# Patient Record
Sex: FEMALE | Race: WHITE | NOT HISPANIC OR LATINO
[De-identification: names, ages, dates, MRNs, and addresses within clinical notes are randomized per-mention and may not be internally consistent; named-entity substitution may affect disease eponyms.]

---

## 2017-02-16 ENCOUNTER — APPOINTMENT (OUTPATIENT)
Dept: SURGERY | Facility: CLINIC | Age: 57
End: 2017-02-16

## 2017-02-16 LAB
ALBUMIN SERPL ELPH-MCNC: 4.7 G/DL
ALP BLD-CCNC: 92 U/L
ALT SERPL-CCNC: 32 U/L
ANION GAP SERPL CALC-SCNC: 13 MMOL/L
AST SERPL-CCNC: 19 U/L
BASOPHILS # BLD AUTO: 0.02 K/UL
BASOPHILS NFR BLD AUTO: 0.4 %
BILIRUB SERPL-MCNC: 0.6 MG/DL
BUN SERPL-MCNC: 17 MG/DL
CALCIUM SERPL-MCNC: 9.8 MG/DL
CHLORIDE SERPL-SCNC: 100 MMOL/L
CHOLEST SERPL-MCNC: 225 MG/DL
CHOLEST/HDLC SERPL: 3.6 RATIO
CO2 SERPL-SCNC: 24 MMOL/L
CREAT SERPL-MCNC: 0.83 MG/DL
EOSINOPHIL # BLD AUTO: 0.14 K/UL
EOSINOPHIL NFR BLD AUTO: 2.5 %
GLUCOSE SERPL-MCNC: 97 MG/DL
HBA1C MFR BLD HPLC: 5.7 %
HCT VFR BLD CALC: 44.8 %
HDLC SERPL-MCNC: 62 MG/DL
HGB BLD-MCNC: 14.5 G/DL
IMM GRANULOCYTES NFR BLD AUTO: 0.4 %
LDLC SERPL CALC-MCNC: 146 MG/DL
LYMPHOCYTES # BLD AUTO: 2.01 K/UL
LYMPHOCYTES NFR BLD AUTO: 35.8 %
MAN DIFF?: NORMAL
MCHC RBC-ENTMCNC: 27.3 PG
MCHC RBC-ENTMCNC: 32.4 GM/DL
MCV RBC AUTO: 84.4 FL
MONOCYTES # BLD AUTO: 0.33 K/UL
MONOCYTES NFR BLD AUTO: 5.9 %
NEUTROPHILS # BLD AUTO: 3.09 K/UL
NEUTROPHILS NFR BLD AUTO: 55 %
PLATELET # BLD AUTO: 264 K/UL
POTASSIUM SERPL-SCNC: 4.2 MMOL/L
PROT SERPL-MCNC: 7.4 G/DL
RBC # BLD: 5.31 M/UL
RBC # FLD: 13.7 %
SODIUM SERPL-SCNC: 137 MMOL/L
TRIGL SERPL-MCNC: 87 MG/DL
WBC # FLD AUTO: 5.61 K/UL

## 2017-02-17 LAB
24R-OH-CALCIDIOL SERPL-MCNC: 61.1 PG/ML
CALCIUM SERPL-MCNC: 9.8 MG/DL
PARATHYROID HORMONE INTACT: 53 PG/ML
T3 SERPL-MCNC: 112 NG/DL
T4 FREE SERPL-MCNC: 1.4 NG/DL
THYROGLOB AB SERPL-ACNC: <20 IU/ML
THYROPEROXIDASE AB SERPL IA-ACNC: <10 IU/ML
TSH SERPL-ACNC: 4.92 UIU/ML

## 2017-02-22 ENCOUNTER — OTHER (OUTPATIENT)
Age: 57
End: 2017-02-22

## 2017-03-19 ENCOUNTER — RESULT REVIEW (OUTPATIENT)
Age: 57
End: 2017-03-19

## 2017-08-14 ENCOUNTER — RX RENEWAL (OUTPATIENT)
Age: 57
End: 2017-08-14

## 2017-08-23 ENCOUNTER — RX RENEWAL (OUTPATIENT)
Age: 57
End: 2017-08-23

## 2017-11-06 ENCOUNTER — RX RENEWAL (OUTPATIENT)
Age: 57
End: 2017-11-06

## 2018-02-05 ENCOUNTER — RX RENEWAL (OUTPATIENT)
Age: 58
End: 2018-02-05

## 2018-03-24 ENCOUNTER — EMERGENCY (EMERGENCY)
Facility: HOSPITAL | Age: 58
LOS: 1 days | Discharge: HOME | End: 2018-03-24
Attending: EMERGENCY MEDICINE | Admitting: EMERGENCY MEDICINE
Payer: COMMERCIAL

## 2018-03-24 VITALS
DIASTOLIC BLOOD PRESSURE: 104 MMHG | HEART RATE: 100 BPM | SYSTOLIC BLOOD PRESSURE: 176 MMHG | WEIGHT: 235.01 LBS | HEIGHT: 70 IN | OXYGEN SATURATION: 95 % | RESPIRATION RATE: 20 BRPM

## 2018-03-24 VITALS
HEART RATE: 86 BPM | SYSTOLIC BLOOD PRESSURE: 157 MMHG | OXYGEN SATURATION: 99 % | RESPIRATION RATE: 16 BRPM | DIASTOLIC BLOOD PRESSURE: 96 MMHG | TEMPERATURE: 98 F

## 2018-03-24 PROCEDURE — 99282 EMERGENCY DEPT VISIT SF MDM: CPT | Mod: 25

## 2018-03-24 RX ORDER — PHENYLEPHRINE HCL 0.25 %
1 AEROSOL, SPRAY WITH PUMP (ML) NASAL ONCE
Qty: 0 | Refills: 0 | Status: COMPLETED | OUTPATIENT
Start: 2018-03-24 | End: 2018-03-24

## 2018-03-24 RX ADMIN — Medication 1 SPRAY(S): at 05:47

## 2018-03-24 NOTE — ED PROVIDER NOTE - PROGRESS NOTE DETAILS
Alejo: Bleeding controlled w/ phenylephrine and direct pressure. Provided w/ ENT follow up, return precautions.

## 2018-03-24 NOTE — ED PROVIDER NOTE - ATTENDING CONTRIBUTION TO CARE
I was physically present for the E/M service provided. I agree with above history, physical, and plan which I have reviewed and edited where appropriate. I was physically present for the key portions of the service provided.    VSS. Bleeding from left nare. Teated with hemostasis per note. f/u ENT.

## 2018-03-24 NOTE — ED PROVIDER NOTE - OBJECTIVE STATEMENT
57yof w/ hypothyroid p/w epistaxis. Woke up w/ severe nose bleed at 0400 this am, initially from the left naris and then bilateral with blood running down the throat. Tried direct pressure which slowed the bleeding but still feels blood running down the throat. Has had nosebleeds in the distant past requiring cautery but no recent bleeding. No trauma.

## 2018-03-24 NOTE — ED PROVIDER NOTE - MEDICAL DECISION MAKING DETAILS
57yof w/ epistaxis, bleeding slowed currently. No unclear source of bleeding. Phenylephrine, pressure, reassess. Pack if necessary. No brisk bleeding or airway compromise. Low suspicion for posterior bleed.

## 2018-03-24 NOTE — ED ADULT NURSE NOTE - CHPI ED SYMPTOMS NEG
no weakness/no change in level of consciousness/no chills/no loss of consciousness/no nausea/no fever/no blurred vision/no numbness/no syncope/no vomiting

## 2018-03-24 NOTE — ED PROVIDER NOTE - ENMT, MLM
Airway patent, Mouth with normal mucosa. Small oozing blood in the left naris, no focal source of bleeding seen anteriorly. Trace blood posterior pharynx.

## 2018-03-24 NOTE — ED ADULT NURSE NOTE - OBJECTIVE STATEMENT
58 yo female pt presents to ed complaining of epistaxis. as per pt "I woke up with a nose bleed, and it won't stop." pt denies blood thinner use. pt bleeding from left nare. md pat at bedside. pt denies trauma/falls. pt denies difficulty breathing. airway patent. no blood in back of throat noted. aox3. breath sounds clear and equal bilaterally. skin warm dry and intact. pt ambulates with steady gait. color normal for race, radial pulses strong and equal bilaterally. cap refill brisk. safety maintained. family at bedside.

## 2018-03-26 RX ORDER — LEVOTHYROXINE SODIUM 125 MCG
0 TABLET ORAL
Qty: 0 | Refills: 0 | COMMUNITY

## 2018-04-23 ENCOUNTER — RESULT REVIEW (OUTPATIENT)
Age: 58
End: 2018-04-23

## 2018-05-01 ENCOUNTER — APPOINTMENT (OUTPATIENT)
Dept: DERMATOLOGY | Facility: CLINIC | Age: 58
End: 2018-05-01
Payer: COMMERCIAL

## 2018-05-01 VITALS
DIASTOLIC BLOOD PRESSURE: 78 MMHG | SYSTOLIC BLOOD PRESSURE: 130 MMHG | WEIGHT: 230 LBS | HEIGHT: 70 IN | BODY MASS INDEX: 32.93 KG/M2

## 2018-05-01 DIAGNOSIS — L82.1 OTHER SEBORRHEIC KERATOSIS: ICD-10-CM

## 2018-05-01 DIAGNOSIS — L91.8 OTHER HYPERTROPHIC DISORDERS OF THE SKIN: ICD-10-CM

## 2018-05-01 DIAGNOSIS — L65.8 OTHER SPECIFIED NONSCARRING HAIR LOSS: ICD-10-CM

## 2018-05-01 DIAGNOSIS — L71.9 ROSACEA, UNSPECIFIED: ICD-10-CM

## 2018-05-01 DIAGNOSIS — L81.4 OTHER MELANIN HYPERPIGMENTATION: ICD-10-CM

## 2018-05-01 DIAGNOSIS — L81.1 CHLOASMA: ICD-10-CM

## 2018-05-01 PROCEDURE — 99203 OFFICE O/P NEW LOW 30 MIN: CPT

## 2018-05-08 ENCOUNTER — APPOINTMENT (OUTPATIENT)
Dept: SURGERY | Facility: CLINIC | Age: 58
End: 2018-05-08
Payer: COMMERCIAL

## 2018-05-08 ENCOUNTER — RX RENEWAL (OUTPATIENT)
Age: 58
End: 2018-05-08

## 2018-05-08 PROCEDURE — 36415 COLL VENOUS BLD VENIPUNCTURE: CPT

## 2018-05-08 PROCEDURE — 99213 OFFICE O/P EST LOW 20 MIN: CPT

## 2018-05-09 LAB
24R-OH-CALCIDIOL SERPL-MCNC: 74.5 PG/ML
BASOPHILS # BLD AUTO: 0.02 K/UL
BASOPHILS NFR BLD AUTO: 0.4 %
EOSINOPHIL # BLD AUTO: 0.12 K/UL
EOSINOPHIL NFR BLD AUTO: 2.2 %
HCT VFR BLD CALC: 41.7 %
HGB BLD-MCNC: 13.6 G/DL
IMM GRANULOCYTES NFR BLD AUTO: 0.2 %
LYMPHOCYTES # BLD AUTO: 1.73 K/UL
LYMPHOCYTES NFR BLD AUTO: 31.3 %
MAN DIFF?: NORMAL
MCHC RBC-ENTMCNC: 27.3 PG
MCHC RBC-ENTMCNC: 32.6 GM/DL
MCV RBC AUTO: 83.6 FL
MONOCYTES # BLD AUTO: 0.25 K/UL
MONOCYTES NFR BLD AUTO: 4.5 %
NEUTROPHILS # BLD AUTO: 3.39 K/UL
NEUTROPHILS NFR BLD AUTO: 61.4 %
PLATELET # BLD AUTO: 289 K/UL
RBC # BLD: 4.99 M/UL
RBC # FLD: 13.6 %
T3 SERPL-MCNC: 109 NG/DL
T4 FREE SERPL-MCNC: 1.5 NG/DL
THYROGLOB AB SERPL-ACNC: <20 IU/ML
THYROPEROXIDASE AB SERPL IA-ACNC: <10 IU/ML
TSH SERPL-ACNC: 4.91 UIU/ML
WBC # FLD AUTO: 5.52 K/UL

## 2018-05-10 ENCOUNTER — OTHER (OUTPATIENT)
Age: 58
End: 2018-05-10

## 2018-05-10 LAB
ALBUMIN SERPL ELPH-MCNC: 4.8 G/DL
ALP BLD-CCNC: 84 U/L
ALT SERPL-CCNC: 34 U/L
ANION GAP SERPL CALC-SCNC: 21 MMOL/L
AST SERPL-CCNC: 27 U/L
BILIRUB SERPL-MCNC: 0.5 MG/DL
BUN SERPL-MCNC: 13 MG/DL
CALCIUM SERPL-MCNC: 10 MG/DL
CHLORIDE SERPL-SCNC: 101 MMOL/L
CHOLEST SERPL-MCNC: 210 MG/DL
CHOLEST/HDLC SERPL: 3.6 RATIO
CO2 SERPL-SCNC: 20 MMOL/L
CREAT SERPL-MCNC: 0.87 MG/DL
GLUCOSE SERPL-MCNC: 89 MG/DL
HDLC SERPL-MCNC: 58 MG/DL
LDLC SERPL CALC-MCNC: 133 MG/DL
POTASSIUM SERPL-SCNC: 4.5 MMOL/L
PROT SERPL-MCNC: 7.5 G/DL
SODIUM SERPL-SCNC: 142 MMOL/L
TRIGL SERPL-MCNC: 97 MG/DL

## 2018-07-02 ENCOUNTER — APPOINTMENT (OUTPATIENT)
Dept: SURGERY | Facility: CLINIC | Age: 58
End: 2018-07-02
Payer: COMMERCIAL

## 2018-07-02 PROCEDURE — 36415 COLL VENOUS BLD VENIPUNCTURE: CPT

## 2018-07-05 ENCOUNTER — RESULT REVIEW (OUTPATIENT)
Age: 58
End: 2018-07-05

## 2018-07-05 LAB
T3 SERPL-MCNC: 98 NG/DL
T4 FREE SERPL-MCNC: 1.2 NG/DL
THYROGLOB AB SERPL-ACNC: <20 IU/ML
THYROPEROXIDASE AB SERPL IA-ACNC: <10 IU/ML
TSH SERPL-ACNC: 4.29 UIU/ML

## 2018-07-06 ENCOUNTER — RESULT REVIEW (OUTPATIENT)
Age: 58
End: 2018-07-06

## 2018-09-14 ENCOUNTER — EMERGENCY (EMERGENCY)
Facility: HOSPITAL | Age: 58
LOS: 1 days | Discharge: HOME | End: 2018-09-14
Attending: EMERGENCY MEDICINE
Payer: COMMERCIAL

## 2018-09-14 VITALS
RESPIRATION RATE: 18 BRPM | TEMPERATURE: 100 F | HEART RATE: 69 BPM | OXYGEN SATURATION: 97 % | SYSTOLIC BLOOD PRESSURE: 125 MMHG | DIASTOLIC BLOOD PRESSURE: 80 MMHG

## 2018-09-14 PROCEDURE — 99284 EMERGENCY DEPT VISIT MOD MDM: CPT | Mod: 25

## 2018-09-15 LAB
ALBUMIN SERPL ELPH-MCNC: 4.5 G/DL — SIGNIFICANT CHANGE UP (ref 3.3–5)
ALP SERPL-CCNC: 82 U/L — SIGNIFICANT CHANGE UP (ref 40–120)
ALT FLD-CCNC: 27 U/L — SIGNIFICANT CHANGE UP (ref 10–45)
ANION GAP SERPL CALC-SCNC: 15 MMOL/L — SIGNIFICANT CHANGE UP (ref 5–17)
APPEARANCE UR: CLEAR — SIGNIFICANT CHANGE UP
AST SERPL-CCNC: 20 U/L — SIGNIFICANT CHANGE UP (ref 10–40)
BACTERIA # UR AUTO: NEGATIVE — SIGNIFICANT CHANGE UP
BASE EXCESS BLDV CALC-SCNC: 0.6 MMOL/L — SIGNIFICANT CHANGE UP (ref -2–2)
BILIRUB SERPL-MCNC: 0.7 MG/DL — SIGNIFICANT CHANGE UP (ref 0.2–1.2)
BILIRUB UR-MCNC: NEGATIVE — SIGNIFICANT CHANGE UP
BUN SERPL-MCNC: 19 MG/DL — SIGNIFICANT CHANGE UP (ref 7–23)
CA-I SERPL-SCNC: 1.3 MMOL/L — SIGNIFICANT CHANGE UP (ref 1.12–1.3)
CALCIUM SERPL-MCNC: 10.2 MG/DL — SIGNIFICANT CHANGE UP (ref 8.4–10.5)
CHLORIDE BLDV-SCNC: 103 MMOL/L — SIGNIFICANT CHANGE UP (ref 96–108)
CHLORIDE SERPL-SCNC: 101 MMOL/L — SIGNIFICANT CHANGE UP (ref 96–108)
CO2 BLDV-SCNC: 27 MMOL/L — SIGNIFICANT CHANGE UP (ref 22–30)
CO2 SERPL-SCNC: 22 MMOL/L — SIGNIFICANT CHANGE UP (ref 22–31)
COLOR SPEC: YELLOW — SIGNIFICANT CHANGE UP
CREAT SERPL-MCNC: 0.86 MG/DL — SIGNIFICANT CHANGE UP (ref 0.5–1.3)
DIFF PNL FLD: NEGATIVE — SIGNIFICANT CHANGE UP
EPI CELLS # UR: 1 /HPF — SIGNIFICANT CHANGE UP
GAS PNL BLDV: 137 MMOL/L — SIGNIFICANT CHANGE UP (ref 136–145)
GAS PNL BLDV: SIGNIFICANT CHANGE UP
GLUCOSE BLDV-MCNC: 106 MG/DL — HIGH (ref 70–99)
GLUCOSE SERPL-MCNC: 102 MG/DL — HIGH (ref 70–99)
GLUCOSE UR QL: NEGATIVE — SIGNIFICANT CHANGE UP
HCO3 BLDV-SCNC: 26 MMOL/L — SIGNIFICANT CHANGE UP (ref 21–29)
HCT VFR BLDA CALC: 42 % — SIGNIFICANT CHANGE UP (ref 39–50)
HGB BLD CALC-MCNC: 13.5 G/DL — SIGNIFICANT CHANGE UP (ref 11.5–15.5)
HYALINE CASTS # UR AUTO: 0 /LPF — SIGNIFICANT CHANGE UP (ref 0–2)
KETONES UR-MCNC: NEGATIVE — SIGNIFICANT CHANGE UP
LACTATE BLDV-MCNC: 1.7 MMOL/L — SIGNIFICANT CHANGE UP (ref 0.7–2)
LEUKOCYTE ESTERASE UR-ACNC: ABNORMAL
LIDOCAIN IGE QN: 17 U/L — SIGNIFICANT CHANGE UP (ref 7–60)
NITRITE UR-MCNC: NEGATIVE — SIGNIFICANT CHANGE UP
PCO2 BLDV: 44 MMHG — SIGNIFICANT CHANGE UP (ref 35–50)
PH BLDV: 7.38 — SIGNIFICANT CHANGE UP (ref 7.35–7.45)
PH UR: 6.5 — SIGNIFICANT CHANGE UP (ref 5–8)
PO2 BLDV: 65 MMHG — HIGH (ref 25–45)
POTASSIUM BLDV-SCNC: 3.9 MMOL/L — SIGNIFICANT CHANGE UP (ref 3.5–5.3)
POTASSIUM SERPL-MCNC: 4 MMOL/L — SIGNIFICANT CHANGE UP (ref 3.5–5.3)
POTASSIUM SERPL-SCNC: 4 MMOL/L — SIGNIFICANT CHANGE UP (ref 3.5–5.3)
PROT SERPL-MCNC: 7.2 G/DL — SIGNIFICANT CHANGE UP (ref 6–8.3)
PROT UR-MCNC: ABNORMAL
RBC CASTS # UR COMP ASSIST: 2 /HPF — SIGNIFICANT CHANGE UP (ref 0–4)
SAO2 % BLDV: 91 % — HIGH (ref 67–88)
SODIUM SERPL-SCNC: 138 MMOL/L — SIGNIFICANT CHANGE UP (ref 135–145)
SP GR SPEC: 1.02 — SIGNIFICANT CHANGE UP (ref 1.01–1.02)
UROBILINOGEN FLD QL: NEGATIVE — SIGNIFICANT CHANGE UP
WBC UR QL: 5 /HPF — SIGNIFICANT CHANGE UP (ref 0–5)

## 2018-09-15 PROCEDURE — 80053 COMPREHEN METABOLIC PANEL: CPT

## 2018-09-15 PROCEDURE — 74177 CT ABD & PELVIS W/CONTRAST: CPT | Mod: 26

## 2018-09-15 PROCEDURE — 82435 ASSAY OF BLOOD CHLORIDE: CPT

## 2018-09-15 PROCEDURE — 99284 EMERGENCY DEPT VISIT MOD MDM: CPT | Mod: 25

## 2018-09-15 PROCEDURE — 74177 CT ABD & PELVIS W/CONTRAST: CPT

## 2018-09-15 PROCEDURE — 83605 ASSAY OF LACTIC ACID: CPT

## 2018-09-15 PROCEDURE — 84132 ASSAY OF SERUM POTASSIUM: CPT

## 2018-09-15 PROCEDURE — 82330 ASSAY OF CALCIUM: CPT

## 2018-09-15 PROCEDURE — 82947 ASSAY GLUCOSE BLOOD QUANT: CPT

## 2018-09-15 PROCEDURE — 81001 URINALYSIS AUTO W/SCOPE: CPT

## 2018-09-15 PROCEDURE — 82803 BLOOD GASES ANY COMBINATION: CPT

## 2018-09-15 PROCEDURE — 84295 ASSAY OF SERUM SODIUM: CPT

## 2018-09-15 PROCEDURE — 85014 HEMATOCRIT: CPT

## 2018-09-15 PROCEDURE — 83690 ASSAY OF LIPASE: CPT

## 2018-09-15 RX ORDER — CIPROFLOXACIN LACTATE 400MG/40ML
500 VIAL (ML) INTRAVENOUS ONCE
Qty: 0 | Refills: 0 | Status: COMPLETED | OUTPATIENT
Start: 2018-09-15 | End: 2018-09-15

## 2018-09-15 RX ORDER — MOXIFLOXACIN HYDROCHLORIDE TABLETS, 400 MG 400 MG/1
1 TABLET, FILM COATED ORAL
Qty: 20 | Refills: 0 | OUTPATIENT
Start: 2018-09-15 | End: 2018-09-24

## 2018-09-15 RX ORDER — METRONIDAZOLE 500 MG
1 TABLET ORAL
Qty: 30 | Refills: 0 | OUTPATIENT
Start: 2018-09-15 | End: 2018-09-24

## 2018-09-15 RX ORDER — METRONIDAZOLE 500 MG
500 TABLET ORAL ONCE
Qty: 0 | Refills: 0 | Status: COMPLETED | OUTPATIENT
Start: 2018-09-15 | End: 2018-09-15

## 2018-09-15 RX ADMIN — Medication 500 MILLIGRAM(S): at 06:12

## 2018-09-15 NOTE — ED PROVIDER NOTE - ATTENDING CONTRIBUTION TO CARE
58F p/w lower abdominal pain. No N/V/D/C. No dysuria. Took Tylenol and Diclofanc PTA. No h/o abd pain. Afebrile. Lungs CTA. Heart RRR. Abdomen soft with LLQ pain. CT a/p diverticulitis. UA r/o UTI/hematuria.

## 2018-09-15 NOTE — ED PROVIDER NOTE - OBJECTIVE STATEMENT
58F hx thyroidectomy p/w LLQ abdominal pain x 1-2 days. Pt says her abdominal pain began in her RLQ 2-3 days ago and has since migrated over to the L. She's had some lower abdominal pain before in the setting of a ?ruptured ovarian cyst 25+ years ago. Shes not sure if this is the same pain, says it's "tender", 8/10 intensity, worse with movement. She couldn't sleep tonight because of it. Assoc w/ nausea, no vomiting. Pt has been having normal NB BMs. Denies f/c, sob, hx of abdominal surgery.

## 2018-09-15 NOTE — ED PROVIDER NOTE - EXITCARE/DISCHARGE INSTRUCTIONS
[facility] [address], [address]  Consent for Procedure/Sedation    Date:     Time:       1.  I authorize the performance upon Sanchez VALENZUELA Provwiliam the following: Peripheral angiography, atherectomy, percutaneous transluminal angiopla you may develop a skin reaction.       Signature of Patient: _______________________________________________________    Signature of person authorized                                           Relationship to  to consent for patient: _______________________ Launch Exitcare and print the 'Prescriptions from this Visit' Report

## 2018-09-15 NOTE — ED PROVIDER NOTE - CARE PLAN
Principal Discharge DX:	Diverticulitis  Assessment and plan of treatment:	1) Please follow-up with your GI doctor (our clinic information is attached).  If you cannot follow-up with your doctor(s), please return to the ED for any urgent issues.  2) If you have any worsening of symptoms or any other concerns please return to the ED immediately.  3) Please continue taking your home medications as directed.  4) You may have been given a copy of your labs and/or imaging.  Please go over these with your primary care doctor.  5) A prescription has been sent to your pharmacy. Please take it as directed.

## 2018-09-15 NOTE — ED ADULT NURSE NOTE - OBJECTIVE STATEMENT
0041 pt 58yf aox4 states lower abd pain x 2 days, inc pain tonight, vss w/ family at bedside no distress noted/gcruz

## 2018-09-15 NOTE — ED ADULT NURSE NOTE - NSIMPLEMENTINTERV_GEN_ALL_ED
Implemented All Universal Safety Interventions:  [place] to call system. Call bell, personal items and telephone within reach. Instruct patient to call for assistance. Room bathroom lighting operational. Non-slip footwear when patient is off stretcher. Physically safe environment: no spills, clutter or unnecessary equipment. Stretcher in lowest position, wheels locked, appropriate side rails in place.

## 2018-09-15 NOTE — ED PROVIDER NOTE - PLAN OF CARE
1) Please follow-up with your GI doctor (our clinic information is attached).  If you cannot follow-up with your doctor(s), please return to the ED for any urgent issues.  2) If you have any worsening of symptoms or any other concerns please return to the ED immediately.  3) Please continue taking your home medications as directed.  4) You may have been given a copy of your labs and/or imaging.  Please go over these with your primary care doctor.  5) A prescription has been sent to your pharmacy. Please take it as directed.

## 2018-09-16 PROBLEM — E07.9 DISORDER OF THYROID, UNSPECIFIED: Chronic | Status: ACTIVE | Noted: 2018-03-24

## 2019-03-28 ENCOUNTER — RESULT REVIEW (OUTPATIENT)
Age: 59
End: 2019-03-28

## 2019-05-06 ENCOUNTER — RESULT REVIEW (OUTPATIENT)
Age: 59
End: 2019-05-06

## 2019-05-06 ENCOUNTER — RX RENEWAL (OUTPATIENT)
Age: 59
End: 2019-05-06

## 2019-05-14 ENCOUNTER — APPOINTMENT (OUTPATIENT)
Dept: SURGERY | Facility: CLINIC | Age: 59
End: 2019-05-14
Payer: COMMERCIAL

## 2019-05-14 PROCEDURE — 36415 COLL VENOUS BLD VENIPUNCTURE: CPT

## 2019-05-14 PROCEDURE — 99213 OFFICE O/P EST LOW 20 MIN: CPT

## 2019-05-14 NOTE — HISTORY OF PRESENT ILLNESS
[de-identified] : 13 years s/p total thyroidectomy for benign disease.   had been well on Synthroid 112 mcg  (never changed dosage).  no changes medically since last visit

## 2019-05-14 NOTE — PHYSICAL EXAM
[de-identified] : well healed scar [de-identified] : no palpable thyroid bed nodules [Laryngoscopy Performed] : laryngoscopy was performed, see procedure section for findings [Midline] : located in midline position [Normal] : cranial nerves 2-12 intact

## 2019-05-15 LAB
24R-OH-CALCIDIOL SERPL-MCNC: 80.3 PG/ML
CALCIUM SERPL-MCNC: 10.4 MG/DL
CALCIUM SERPL-MCNC: 10.4 MG/DL
PARATHYROID HORMONE INTACT: 52 PG/ML
T3 SERPL-MCNC: 91 NG/DL
T4 FREE SERPL-MCNC: 1.3 NG/DL
THYROGLOB AB SERPL-ACNC: <20 IU/ML
THYROPEROXIDASE AB SERPL IA-ACNC: <10 IU/ML
TSH SERPL-ACNC: 6.53 UIU/ML

## 2019-05-20 ENCOUNTER — RESULT REVIEW (OUTPATIENT)
Age: 59
End: 2019-05-20

## 2019-08-23 ENCOUNTER — RX RENEWAL (OUTPATIENT)
Age: 59
End: 2019-08-23

## 2019-10-01 ENCOUNTER — RX RENEWAL (OUTPATIENT)
Age: 59
End: 2019-10-01

## 2019-12-30 ENCOUNTER — RX RENEWAL (OUTPATIENT)
Age: 59
End: 2019-12-30

## 2020-06-14 ENCOUNTER — RX RENEWAL (OUTPATIENT)
Age: 60
End: 2020-06-14

## 2020-08-04 ENCOUNTER — APPOINTMENT (OUTPATIENT)
Dept: SURGERY | Facility: CLINIC | Age: 60
End: 2020-08-04
Payer: COMMERCIAL

## 2020-08-04 PROCEDURE — 99213 OFFICE O/P EST LOW 20 MIN: CPT

## 2020-08-04 PROCEDURE — 36415 COLL VENOUS BLD VENIPUNCTURE: CPT

## 2020-08-04 NOTE — ASSESSMENT
[FreeTextEntry1] : will observe.  bloods drawn.  to call next week for results. to return earlier if any change

## 2020-08-04 NOTE — PHYSICAL EXAM
[de-identified] : well healed scar [de-identified] : no palpable thyroid bed nodules [Laryngoscopy Performed] : laryngoscopy was performed, see procedure section for findings [Midline] : located in midline position [Normal] : orientation to person, place, and time: normal

## 2020-08-04 NOTE — HISTORY OF PRESENT ILLNESS
[de-identified] : 14 years s/p total thyroidectomy for benign disease.   had been well on Synthroid 112 mcg  .  denies dysphagia, hoarseness or new lesions.  no changes medically since last visit

## 2020-08-05 LAB
T3 SERPL-MCNC: 99 NG/DL
T4 FREE SERPL-MCNC: 1.2 NG/DL
THYROGLOB AB SERPL-ACNC: <20 IU/ML
THYROPEROXIDASE AB SERPL IA-ACNC: <10 IU/ML
TSH SERPL-ACNC: 6.71 UIU/ML

## 2020-10-22 ENCOUNTER — NON-APPOINTMENT (OUTPATIENT)
Age: 60
End: 2020-10-22

## 2020-11-17 ENCOUNTER — NON-APPOINTMENT (OUTPATIENT)
Age: 60
End: 2020-11-17

## 2021-04-26 NOTE — ED PROVIDER NOTE - DATE/TIME 1
Withdrawal symptoms include negative mood, urges to smoke, and difficulty concentrating
24-Mar-2018 06:53

## 2021-04-27 ENCOUNTER — NON-APPOINTMENT (OUTPATIENT)
Age: 61
End: 2021-04-27

## 2021-04-27 LAB
T3 SERPL-MCNC: 100 NG/DL
T4 FREE SERPL-MCNC: 1.4 NG/DL
THYROGLOB AB SERPL-ACNC: <20 IU/ML
THYROPEROXIDASE AB SERPL IA-ACNC: <10 IU/ML
TSH SERPL-ACNC: 1.69 UIU/ML

## 2021-04-29 ENCOUNTER — NON-APPOINTMENT (OUTPATIENT)
Age: 61
End: 2021-04-29

## 2021-06-01 ENCOUNTER — NON-APPOINTMENT (OUTPATIENT)
Age: 61
End: 2021-06-01

## 2021-06-01 ENCOUNTER — APPOINTMENT (OUTPATIENT)
Dept: CARDIOLOGY | Facility: CLINIC | Age: 61
End: 2021-06-01
Payer: COMMERCIAL

## 2021-06-01 VITALS
WEIGHT: 236 LBS | SYSTOLIC BLOOD PRESSURE: 144 MMHG | DIASTOLIC BLOOD PRESSURE: 85 MMHG | BODY MASS INDEX: 33.79 KG/M2 | HEIGHT: 70 IN | HEART RATE: 78 BPM | OXYGEN SATURATION: 96 %

## 2021-06-01 DIAGNOSIS — Z86.39 PERSONAL HISTORY OF OTHER ENDOCRINE, NUTRITIONAL AND METABOLIC DISEASE: ICD-10-CM

## 2021-06-01 DIAGNOSIS — E55.9 VITAMIN D DEFICIENCY, UNSPECIFIED: ICD-10-CM

## 2021-06-01 PROCEDURE — 93000 ELECTROCARDIOGRAM COMPLETE: CPT

## 2021-06-01 PROCEDURE — 99072 ADDL SUPL MATRL&STAF TM PHE: CPT

## 2021-06-01 PROCEDURE — 99214 OFFICE O/P EST MOD 30 MIN: CPT

## 2021-06-01 RX ORDER — NITROFURANTOIN MACROCRYSTALS 100 MG/1
100 CAPSULE ORAL
Qty: 10 | Refills: 1 | Status: DISCONTINUED | COMMUNITY
Start: 2018-03-14 | End: 2021-06-01

## 2021-06-01 RX ORDER — HYDROQUINONE 40 MG/G
4 CREAM TOPICAL TWICE DAILY
Qty: 1 | Refills: 5 | Status: DISCONTINUED | COMMUNITY
Start: 2018-05-01 | End: 2021-06-01

## 2021-06-01 RX ORDER — LEVOTHYROXINE SODIUM 0.11 MG/1
112 TABLET ORAL DAILY
Qty: 90 | Refills: 3 | Status: DISCONTINUED | COMMUNITY
Start: 2020-08-10 | End: 2021-06-01

## 2021-06-01 RX ORDER — LEVOTHYROXINE SODIUM 125 UG/1
125 TABLET ORAL
Qty: 90 | Refills: 0 | Status: DISCONTINUED | COMMUNITY
Start: 2017-08-14 | End: 2021-06-01

## 2021-06-03 LAB
25(OH)D3 SERPL-MCNC: 21.3 NG/ML
ALBUMIN SERPL ELPH-MCNC: 4.8 G/DL
ALP BLD-CCNC: 110 U/L
ALT SERPL-CCNC: 35 U/L
ANION GAP SERPL CALC-SCNC: 10 MMOL/L
AST SERPL-CCNC: 21 U/L
BASOPHILS # BLD AUTO: 0.04 K/UL
BASOPHILS NFR BLD AUTO: 0.6 %
BILIRUB SERPL-MCNC: 0.4 MG/DL
BUN SERPL-MCNC: 18 MG/DL
CALCIUM SERPL-MCNC: 10.4 MG/DL
CERULOPLASMIN SERPL-MCNC: 19 MG/DL
CHLORIDE SERPL-SCNC: 102 MMOL/L
CHOLEST SERPL-MCNC: 225 MG/DL
CO2 SERPL-SCNC: 27 MMOL/L
CREAT SERPL-MCNC: 0.88 MG/DL
EOSINOPHIL # BLD AUTO: 0.34 K/UL
EOSINOPHIL NFR BLD AUTO: 5.5 %
ESTIMATED AVERAGE GLUCOSE: 105 MG/DL
FERRITIN SERPL-MCNC: 285 NG/ML
GLUCOSE SERPL-MCNC: 98 MG/DL
HBA1C MFR BLD HPLC: 5.3 %
HCT VFR BLD CALC: 46.6 %
HDLC SERPL-MCNC: 61 MG/DL
HGB BLD-MCNC: 14.6 G/DL
IMM GRANULOCYTES NFR BLD AUTO: 0.5 %
IRON SATN MFR SERPL: 18 %
IRON SERPL-MCNC: 62 UG/DL
LDLC SERPL CALC-MCNC: 138 MG/DL
LYMPHOCYTES # BLD AUTO: 1.97 K/UL
LYMPHOCYTES NFR BLD AUTO: 31.6 %
MAN DIFF?: NORMAL
MCHC RBC-ENTMCNC: 26.6 PG
MCHC RBC-ENTMCNC: 31.3 GM/DL
MCV RBC AUTO: 85 FL
MONOCYTES # BLD AUTO: 0.44 K/UL
MONOCYTES NFR BLD AUTO: 7.1 %
NEUTROPHILS # BLD AUTO: 3.41 K/UL
NEUTROPHILS NFR BLD AUTO: 54.7 %
NONHDLC SERPL-MCNC: 164 MG/DL
PLATELET # BLD AUTO: 277 K/UL
POTASSIUM SERPL-SCNC: 4.7 MMOL/L
PROT SERPL-MCNC: 7 G/DL
RBC # BLD: 5.48 M/UL
RBC # FLD: 13.2 %
SODIUM SERPL-SCNC: 139 MMOL/L
T4 SERPL-MCNC: 8.2 UG/DL
TIBC SERPL-MCNC: 336 UG/DL
TRIGL SERPL-MCNC: 127 MG/DL
TSH SERPL-ACNC: 5.21 UIU/ML
UIBC SERPL-MCNC: 274 UG/DL
WBC # FLD AUTO: 6.23 K/UL

## 2021-07-15 ENCOUNTER — APPOINTMENT (OUTPATIENT)
Dept: CARDIOLOGY | Facility: CLINIC | Age: 61
End: 2021-07-15

## 2021-09-16 ENCOUNTER — APPOINTMENT (OUTPATIENT)
Dept: MAMMOGRAPHY | Facility: CLINIC | Age: 61
End: 2021-09-16
Payer: COMMERCIAL

## 2021-09-16 ENCOUNTER — APPOINTMENT (OUTPATIENT)
Dept: ULTRASOUND IMAGING | Facility: CLINIC | Age: 61
End: 2021-09-16
Payer: COMMERCIAL

## 2021-09-16 PROCEDURE — 77067 SCR MAMMO BI INCL CAD: CPT

## 2021-09-16 PROCEDURE — 76641 ULTRASOUND BREAST COMPLETE: CPT | Mod: 50

## 2021-09-16 PROCEDURE — 77063 BREAST TOMOSYNTHESIS BI: CPT

## 2021-09-27 ENCOUNTER — RESULT REVIEW (OUTPATIENT)
Age: 61
End: 2021-09-27

## 2021-10-29 ENCOUNTER — APPOINTMENT (OUTPATIENT)
Dept: HEPATOLOGY | Facility: CLINIC | Age: 61
End: 2021-10-29
Payer: COMMERCIAL

## 2021-10-29 VITALS
BODY MASS INDEX: 32.07 KG/M2 | DIASTOLIC BLOOD PRESSURE: 77 MMHG | HEIGHT: 70 IN | RESPIRATION RATE: 18 BRPM | HEART RATE: 75 BPM | SYSTOLIC BLOOD PRESSURE: 135 MMHG | WEIGHT: 224 LBS | TEMPERATURE: 98 F

## 2021-10-29 DIAGNOSIS — R79.89 OTHER SPECIFIED ABNORMAL FINDINGS OF BLOOD CHEMISTRY: ICD-10-CM

## 2021-10-29 DIAGNOSIS — Z82.49 FAMILY HISTORY OF ISCHEMIC HEART DISEASE AND OTHER DISEASES OF THE CIRCULATORY SYSTEM: ICD-10-CM

## 2021-10-29 DIAGNOSIS — R16.1 SPLENOMEGALY, NOT ELSEWHERE CLASSIFIED: ICD-10-CM

## 2021-10-29 PROCEDURE — 99204 OFFICE O/P NEW MOD 45 MIN: CPT

## 2021-10-29 NOTE — HISTORY OF PRESENT ILLNESS
[de-identified] : Ms. Newsome is a 62 yo F with obesity, prior thyroidectomy due to multinodular goiter, and history of diverticulitis, who is being seen for evaluation of hepatomegaly, steatosis, and borderline splenomegaly seen on recent sonogram as well as elevated ferritin levels.\par \par PCP / Cardiology: Dr. Kannan Almanza\par Hematology: Dr. Mau Clement\par \par CT abdomen/pelvis with contrast (9/15/18): Steatosis. Normal spleen. Acute uncomplicated sigmoid colon diverticulitis.\par \par Abdominal US (21, [facility]): Limited exam due to body habitus and increased abdominal bowel gas. Borderline hepatomegalyt (18 cm) with diffusely increased echogenicity suggesting steatosis. No focal hepatic lesion. Splenomegaly (14.1 cm). Main portal vein patent with normal direction of flow. No ascites.\par \par She reports that when she last saw her former PCP in [place] in 2019, he noted on routine labs that she had an elevated ferritin of 295. He referred her to see hematologist Dr. Clement, who she saw for initial consultation in 2020 with repeat ferritin 263. She saw him again for follow-up in 2021 with repeat ferritin 297. He ordered the abdominal sonogram (above) that showed hepatosplenomegaly and steatosis and recommended that she see GI Dr. Kyree Bustamante for consultation. Dr. Bustamante ordered an MRI abdomen with and without contrast with MR elastography for further evaluation, but she opted not to do that test yet as her new PCP Dr. Almanza recommended that she discuss it with me first.\par \par She denies any history of jaundice, scleral icterus, abdominal distension, or lower extremity swelling. She has some fatigue. No confusion.\par \par She says she has been a "big girl" for years but was at her heaviest this past summer. After the sonogram, she was told that she needed to lose weight due to the fatty liver and has successfully lost 14 lbs by watching her diet more carefully and trying to watch her portion sizes. She says she has always been a "healthy eater", including doing her own cooking with very little salt, eating lots of vegetables and salads, choosing whole foods and sprouted grains, and eating mainly chicken and fish as her proteins, with only rare red meat. She enjoys potato chips but only buys them rarely. She usually does not crave sweets. She drinks decaf coffee or tea once daily but otherwise mainly drinks water or seltzer, with Coke Zero rarely. No alcohol.\par \par She had been trying to walk more for exercise but took a fall from a curb and has not been able to exercise regularly since then. She says she needs a cortisone shot for her knee and also has shoulder pains.\par \par She had a colonoscopy 3 years ago and says it was fine.\par \par She has no known family history of any liver disease. Her mother is 90 years old and has Afib, diabetes, and traumatic brain injury from a pedestrian accident. Her father  of colon cancer (diagnosed at age 61) and had had prior cholecystectomy. Her brother  at age 57 (2 years ago) of a massive MI and had preceding hypertension. Her 37-year-old daughter is healthy.\par \par She lives with her . 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 Never smoker. No recreational drug use.

## 2021-10-29 NOTE — CONSULT LETTER
[Dear  ___] : Dear  [unfilled], [( Thank you for referring [unfilled] for consultation for _____ )] : Thank you for referring [unfilled] for consultation for [unfilled] [Please see my note below.] : Please see my note below. [Consult Closing:] : Thank you very much for allowing me to participate in the care of this patient.  If you have any questions, please do not hesitate to contact me. [FreeTextEntry2] : Dr. Kannan Almanza [FreeTextEntry3] : Sincerely,\par \par Molly De Anda M.D., Ph.D.\par Director, Women's Liver Health Program, [facility]\par Transplant Hepatology, [facility]\par  of Medicine\par [facility] [employer] [facility]\par [address]\[place] [address]\par Tel: [phone]\par Fax: [phone]\par Cell: [phone]\par E-mail: [email or web address]

## 2021-10-29 NOTE — ASSESSMENT
[FreeTextEntry1] : 60 yo F with obesity, prior thyroidectomy due to multinodular goiter, and history of diverticulitis, with hepatomegaly, steatosis, and borderline splenomegaly seen on recent sonogram as well as elevated ferritin levels, all likely secondary to nonalcoholic (metabolic) steatohepatitis (BOSWELL).\par \par Laboratory work-up ordered today to rule out other causes of chronic liver disease.\par \par We discussed that her modestly elevated ferritin levels are most likely secondary to BOSWELL, with no clinical suspicion for hemochromatosis given normal transferrin saturation of 18%.\par \par She has mild (<2x ULN) ALT elevations on recent labs but normal liver synthetic function and normal platelet count. Based on her most recent labs from 7/9/21, her calculated FIB-4 score =1.06 and her calculated NAFLD fibrosis score =-2.41, both suggesting against her having any advanced hepatic fibrosis. However, given sonographic evidence of splenomegaly that could be indicative of portal hypertension, I recommended MRI abdomen with and without contrast with MR elastography for further evaluation, to exclude cirrhotic liver morphology, radiologic signs of portal hypertension, and for non-invasive staging of hepatic fibrosis.\par \par We discussed the probable iagnosis of nonalcoholic fatty liver disease (NAFLD) at length today. I reviewed the natural history, evaluation and staging of the disease, and prognosis, including possible risks of development of compensated cirrhosis, decompensated cirrhosis, and hepatocellular carcinoma (HCC) as well as increased risks of diabetes mellitus, chronic kidney disease, and cardiovascular disease.\par \par We discussed that NAFLD is potentially reversible and that lifestyle modifications are crucial for management of NAFLD. I recommended additional gradual weight loss of at least 10% of her body weight (20-25 lbs). I recommended coffee consumption (up to 3-4 cups/day if desired), adherence to a Mediterranean style diet with increased consumption of vegetables and lean proteins, avoidance of red meat and high fructose corn syrup, and avoidance of calorie-containing beverages. Since her diet already meets most of these criteria, we discussed that portion size may be a problem and I also recommended consultation with nutritionist Linda Bailey from the [facility] Women's [facility] for trouble-shooting any non-obvious dietary issues that may be slowing her weight loss and also adversely affecting her metabolic health. I also recommended moderate intensity exercise for a minimum of 20-30 minutes at least 3 times per week. These changes have been shown to lead to regression or even resolution of steatosis, inflammation, and even fibrosis in some patients.\par \par We discussed that currently, there are no FDA-approved pharmacologic treatments for NAFLD, but that this may change within the next 1-2 years as a number of promising drugs are currently being investigated in clinical trials. We briefly discussed that we may consider pharmacologic therapy in the future either off label or through a clinical trial if she has evidence of progressive liver disease based on her labs and elastography, especially if not improving with ongoing lifestyle modifications and weight loss.\par \par Next follow-up: 1 month

## 2021-11-01 LAB
AFP-TM SERPL-MCNC: 4.3 NG/ML
ALBUMIN SERPL ELPH-MCNC: 4.7 G/DL
ALP BLD-CCNC: 109 U/L
ALT SERPL-CCNC: 27 U/L
ANION GAP SERPL CALC-SCNC: 13 MMOL/L
AST SERPL-CCNC: 16 U/L
BASOPHILS # BLD AUTO: 0.04 K/UL
BASOPHILS NFR BLD AUTO: 0.7 %
BILIRUB SERPL-MCNC: 0.4 MG/DL
BUN SERPL-MCNC: 16 MG/DL
CALCIUM SERPL-MCNC: 10.5 MG/DL
CHLORIDE SERPL-SCNC: 102 MMOL/L
CO2 SERPL-SCNC: 23 MMOL/L
CREAT SERPL-MCNC: 0.75 MG/DL
DEPRECATED KAPPA LC FREE/LAMBDA SER: 0.94 RATIO
EOSINOPHIL # BLD AUTO: 0.21 K/UL
EOSINOPHIL NFR BLD AUTO: 3.6 %
GGT SERPL-CCNC: 21 U/L
GLUCOSE SERPL-MCNC: 95 MG/DL
HBV CORE IGG+IGM SER QL: NONREACTIVE
HBV SURFACE AB SERPL IA-ACNC: <3 MIU/ML
HBV SURFACE AG SER QL: NONREACTIVE
HCT VFR BLD CALC: 46.1 %
HCV AB SER QL: NONREACTIVE
HCV S/CO RATIO: 0.13 S/CO
HEPATITIS A IGG ANTIBODY: NONREACTIVE
HGB BLD-MCNC: 14.7 G/DL
IGA SER QL IEP: 140 MG/DL
IGG SER QL IEP: 950 MG/DL
IGM SER QL IEP: 103 MG/DL
IMM GRANULOCYTES NFR BLD AUTO: 0.3 %
KAPPA LC CSF-MCNC: 1.29 MG/DL
KAPPA LC SERPL-MCNC: 1.21 MG/DL
LYMPHOCYTES # BLD AUTO: 1.78 K/UL
LYMPHOCYTES NFR BLD AUTO: 30.1 %
MAN DIFF?: NORMAL
MCHC RBC-ENTMCNC: 27.2 PG
MCHC RBC-ENTMCNC: 31.9 GM/DL
MCV RBC AUTO: 85.2 FL
MITOCHONDRIA AB SER IF-ACNC: NORMAL
MONOCYTES # BLD AUTO: 0.42 K/UL
MONOCYTES NFR BLD AUTO: 7.1 %
NEUTROPHILS # BLD AUTO: 3.44 K/UL
NEUTROPHILS NFR BLD AUTO: 58.2 %
PLATELET # BLD AUTO: 329 K/UL
POTASSIUM SERPL-SCNC: 4.3 MMOL/L
PROT SERPL-MCNC: 6.9 G/DL
RBC # BLD: 5.41 M/UL
RBC # FLD: 12.8 %
SMOOTH MUSCLE AB SER QL IF: ABNORMAL
SODIUM SERPL-SCNC: 138 MMOL/L
WBC # FLD AUTO: 5.91 K/UL

## 2021-11-02 LAB
A1AT PHENOTYP SERPL-IMP: NORMAL
A1AT SERPL-MCNC: 126 MG/DL

## 2021-11-03 LAB — ANA SER IF-ACNC: NEGATIVE

## 2021-11-09 ENCOUNTER — APPOINTMENT (OUTPATIENT)
Dept: SURGERY | Facility: CLINIC | Age: 61
End: 2021-11-09

## 2021-12-06 ENCOUNTER — APPOINTMENT (OUTPATIENT)
Dept: HEPATOLOGY | Facility: CLINIC | Age: 61
End: 2021-12-06

## 2021-12-10 ENCOUNTER — NON-APPOINTMENT (OUTPATIENT)
Age: 61
End: 2021-12-10

## 2021-12-15 ENCOUNTER — NON-APPOINTMENT (OUTPATIENT)
Age: 61
End: 2021-12-15

## 2022-01-05 ENCOUNTER — APPOINTMENT (OUTPATIENT)
Dept: NUTRITION | Facility: CLINIC | Age: 62
End: 2022-01-05
Payer: COMMERCIAL

## 2022-01-05 PROCEDURE — 97802 MEDICAL NUTRITION INDIV IN: CPT | Mod: 95

## 2022-01-06 ENCOUNTER — RESULT REVIEW (OUTPATIENT)
Age: 62
End: 2022-01-06

## 2022-01-06 ENCOUNTER — APPOINTMENT (OUTPATIENT)
Dept: MRI IMAGING | Facility: IMAGING CENTER | Age: 62
End: 2022-01-06
Payer: COMMERCIAL

## 2022-01-06 ENCOUNTER — OUTPATIENT (OUTPATIENT)
Dept: OUTPATIENT SERVICES | Facility: HOSPITAL | Age: 62
LOS: 1 days | End: 2022-01-06
Payer: COMMERCIAL

## 2022-01-06 DIAGNOSIS — K76.0 FATTY (CHANGE OF) LIVER, NOT ELSEWHERE CLASSIFIED: ICD-10-CM

## 2022-01-06 DIAGNOSIS — R79.89 OTHER SPECIFIED ABNORMAL FINDINGS OF BLOOD CHEMISTRY: ICD-10-CM

## 2022-01-06 PROCEDURE — 76391 MR ELASTOGRAPHY: CPT | Mod: 26

## 2022-01-06 PROCEDURE — 74183 MRI ABD W/O CNTR FLWD CNTR: CPT

## 2022-01-06 PROCEDURE — A9585: CPT

## 2022-01-06 PROCEDURE — 74183 MRI ABD W/O CNTR FLWD CNTR: CPT | Mod: 26

## 2022-01-06 PROCEDURE — 76391 MR ELASTOGRAPHY: CPT

## 2022-01-25 ENCOUNTER — APPOINTMENT (OUTPATIENT)
Dept: HEPATOLOGY | Facility: CLINIC | Age: 62
End: 2022-01-25
Payer: COMMERCIAL

## 2022-01-25 VITALS
BODY MASS INDEX: 30.92 KG/M2 | HEIGHT: 70 IN | OXYGEN SATURATION: 96 % | TEMPERATURE: 98 F | WEIGHT: 216 LBS | SYSTOLIC BLOOD PRESSURE: 149 MMHG | HEART RATE: 85 BPM | RESPIRATION RATE: 18 BRPM | DIASTOLIC BLOOD PRESSURE: 89 MMHG

## 2022-01-25 DIAGNOSIS — K86.2 CYST OF PANCREAS: ICD-10-CM

## 2022-01-25 PROCEDURE — 99214 OFFICE O/P EST MOD 30 MIN: CPT

## 2022-01-26 NOTE — HISTORY OF PRESENT ILLNESS
[de-identified] : Ms. Newsome is a 62 yo F with obesity, prior thyroidectomy due to multinodular goiter, and history of diverticulitis, who is being seen for follow-up of nonalcoholic (metabolic) fatty liver disease (NAFLD).\par \par PCP / Cardiology: Dr. Kannan Almanza = referring physician\par Hematology: Dr. Mau Clement\par \par CT abdomen/pelvis with contrast (9/15/18): Steatosis. Normal spleen. Acute uncomplicated sigmoid colon diverticulitis.\par \par Abdominal US (21, [facility]): Limited exam due to body habitus and increased abdominal bowel gas. Borderline hepatomegalyt (18 cm) with diffusely increased echogenicity suggesting steatosis. No focal hepatic lesion. Splenomegaly (14.1 cm). Main portal vein patent with normal direction of flow. No ascites.\par \par MRI abdomen with and without contrast with MR elastography (22): Normal liver morphology. Diffuse hepatic steatosis (with hepatic fat fraction 21%, consistent with severe steatosis). No hepatic iron deposition. Hepatic stiffness 2.0 kPA (normal). Normal spleen. No ascites. Patent hepatic and portal veins. Few subcentimeter renal cysts. 1.1 cm simple pancreatic tail cyst with normal caliber main pancreatic duct.\par \par She was last seen by me on 10/29/21 and is here today to discuss the results of her MR elastography (above) and further management. She has lost 5 lbs since her last visit. She eats mainly whole foods that she prepares herself and has also been trying to walk more, recently twice weekly for 30-45 minutes. She also met with dietician Linda Bailey for consultation on 22.\par \par She had a colonoscopy 3 years ago and says it was fine.\par \par She has no known family history of any liver disease. Her mother is 90 years old and has Afib, diabetes, and traumatic brain injury from a pedestrian accident. Her father  of colon cancer (diagnosed at age 61) and had had prior cholecystectomy. Her brother  at age 57 (2 years ago) of a massive MI and had preceding hypertension. Her 37-year-old daughter is healthy.\par \par She lives with her . 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 Never smoker. No recreational drug use.

## 2022-01-26 NOTE — CONSULT LETTER
[Dear  ___] : Dear  [unfilled], [( Thank you for referring [unfilled] for consultation for _____ )] : Thank you for referring [unfilled] for consultation for [unfilled] [Please see my note below.] : Please see my note below. [Consult Closing:] : Thank you very much for allowing me to participate in the care of this patient.  If you have any questions, please do not hesitate to contact me. [FreeTextEntry2] : Dr. Kannan Almanza [FreeTextEntry3] : Sincerely,\par \par Molly De Anda M.D., Ph.D.\par Director, Women's Liver Health Program, [facility]\par Transplant Hepatology, [facility]\par  of Medicine\par [facility] [employer] [facility] at [facility]\par [address]\[place] [address]\par Tel: [phone]\par Fax: [phone]\par Cell: [phone]\par E-mail: [email or web address]

## 2022-01-26 NOTE — ASSESSMENT
[FreeTextEntry1] : 60 yo F with obesity, prior thyroidectomy due to multinodular goiter, and history of diverticulitis, with radiologic and laboratory evidence of nonalcoholic (metabolic) fatty liver disease (NAFLD).\par \par # NAFLD:\par \par She has no significant alcohol history. Laboratory work-up for other causes of chronic liver disease was negative apart from weakly positive ASMA with 1:20 titer with KATIE negative and IgG within normal limits and modestly elevated ferritin levels but with normal transferrin saturation. The ferritin elevations are most likely secondary to BOSWELL, with no clinical suspicion for hemochromatosis given normal transferrin saturation.\par \par While there was concern for borderline hepatosplenomegaly based on prior US, recent MR elastography showed normal liver morphology and size, normal spleen size, and no radiologic evidence of portal hypertension. There was evidence of severe steatosis but with no significant hepatic fibrosis suggested by elastography. This is concordant with calculated FIB-4 and NAFLD fibrosis scores based on her prior labs, both of which suggested against her having advanced hepatic fibrosis.\par \par We have discussed the diagnosis of nonalcoholic fatty liver disease (NAFLD) at length today. I reviewed the natural history, evaluation and staging of the disease, including her MR elastography results in detail. We discussed the prognosis, including possible risks of development of compensated cirrhosis, decompensated cirrhosis, and hepatocellular carcinoma (HCC) as well as increased risks of diabetes mellitus, chronic kidney disease, and cardiovascular disease.\par \par We discussed that NAFLD is potentially reversible and that lifestyle modifications are crucial for management of NAFLD. I congradulated her on her weight loss thus far and recommended additional gradual weight loss of at least 10% of her body weight (20-25 lbs), with a goal of 190 lbs prior to her next visit in 1 year. I recommended coffee consumption (up to 3-4 cups/day if desired), adherence to a Mediterranean style diet with increased consumption of vegetables and lean proteins, avoidance of red meat and high fructose corn syrup, and avoidance of calorie-containing beverages. She had recent consultation with nutritionist Linda Bailey from the [facility] for Women's [facility] for personalized dietary counseling. I encouraged her to consider dietary tracking such as with a smartphone airel. I also recommended moderate intensity exercise for a minimum of 20-30 minutes at least 3 times per week. These changes have been shown to lead to regression or even resolution of steatosis, inflammation, and even fibrosis in some patients.\par \par We discussed that currently, there are no FDA-approved pharmacologic treatments for NAFLD, but that this may change within the next 1-2 years as a number of promising drugs are currently being investigated in clinical trials. We briefly discussed that we may consider pharmacologic therapy in the future either off label or through a clinical trial if she has evidence of progressive liver disease based on her labs and elastography, especially if not improving with ongoing lifestyle modifications and weight loss.\par \par We will plan for surveillance with repeat elastography in 1 year, ordered today.\par \par # Pancreatic cystic lesion:\par - No high risk features seen on recent MRI (12/2/21). Repeat in 1 year, ordered today.\par \par Next follow-up: 1  year, after next MRI/MR elastography

## 2022-02-15 ENCOUNTER — APPOINTMENT (OUTPATIENT)
Dept: SURGERY | Facility: CLINIC | Age: 62
End: 2022-02-15
Payer: COMMERCIAL

## 2022-02-15 PROCEDURE — 99213 OFFICE O/P EST LOW 20 MIN: CPT | Mod: 25

## 2022-02-15 PROCEDURE — 36415 COLL VENOUS BLD VENIPUNCTURE: CPT

## 2022-02-15 NOTE — PHYSICAL EXAM
[de-identified] : well healed scar [Midline] : located in midline position [Normal] : orientation to person, place, and time: normal

## 2022-02-16 ENCOUNTER — NON-APPOINTMENT (OUTPATIENT)
Age: 62
End: 2022-02-16

## 2022-02-16 LAB
T3 SERPL-MCNC: 114 NG/DL
T4 FREE SERPL-MCNC: 1.8 NG/DL
THYROGLOB AB SERPL-ACNC: <20 IU/ML
THYROPEROXIDASE AB SERPL IA-ACNC: <10 IU/ML
TSH SERPL-ACNC: 0.38 UIU/ML

## 2022-08-11 ENCOUNTER — APPOINTMENT (OUTPATIENT)
Dept: CARDIOLOGY | Facility: CLINIC | Age: 62
End: 2022-08-11

## 2022-08-11 ENCOUNTER — NON-APPOINTMENT (OUTPATIENT)
Age: 62
End: 2022-08-11

## 2022-08-11 VITALS
WEIGHT: 222 LBS | OXYGEN SATURATION: 97 % | BODY MASS INDEX: 31.85 KG/M2 | HEART RATE: 95 BPM | SYSTOLIC BLOOD PRESSURE: 140 MMHG | DIASTOLIC BLOOD PRESSURE: 80 MMHG

## 2022-08-11 DIAGNOSIS — R16.0 HEPATOMEGALY, NOT ELSEWHERE CLASSIFIED: ICD-10-CM

## 2022-08-11 DIAGNOSIS — M54.6 PAIN IN THORACIC SPINE: ICD-10-CM

## 2022-08-11 DIAGNOSIS — M54.50 LOW BACK PAIN, UNSPECIFIED: ICD-10-CM

## 2022-08-11 PROCEDURE — 99214 OFFICE O/P EST MOD 30 MIN: CPT

## 2022-09-15 LAB
25(OH)D3 SERPL-MCNC: 19.8 NG/ML
ALBUMIN SERPL ELPH-MCNC: 4.3 G/DL
ALP BLD-CCNC: 111 U/L
ALT SERPL-CCNC: 31 U/L
ANION GAP SERPL CALC-SCNC: 13 MMOL/L
AST SERPL-CCNC: 20 U/L
BASOPHILS # BLD AUTO: 0.02 K/UL
BASOPHILS NFR BLD AUTO: 0.4 %
BILIRUB SERPL-MCNC: 0.5 MG/DL
BUN SERPL-MCNC: 21 MG/DL
CALCIUM SERPL-MCNC: 10 MG/DL
CHLORIDE SERPL-SCNC: 101 MMOL/L
CHOLEST SERPL-MCNC: 205 MG/DL
CO2 SERPL-SCNC: 24 MMOL/L
CREAT SERPL-MCNC: 0.81 MG/DL
EGFR: 81 ML/MIN/1.73M2
EOSINOPHIL # BLD AUTO: 0.16 K/UL
EOSINOPHIL NFR BLD AUTO: 3.5 %
ESTIMATED AVERAGE GLUCOSE: 114 MG/DL
GLUCOSE SERPL-MCNC: 97 MG/DL
HBA1C MFR BLD HPLC: 5.6 %
HCT VFR BLD CALC: 43 %
HDLC SERPL-MCNC: 57 MG/DL
HGB BLD-MCNC: 13.9 G/DL
IMM GRANULOCYTES NFR BLD AUTO: 0.4 %
LDLC SERPL CALC-MCNC: 131 MG/DL
LYMPHOCYTES # BLD AUTO: 1.3 K/UL
LYMPHOCYTES NFR BLD AUTO: 28.5 %
MAN DIFF?: NORMAL
MCHC RBC-ENTMCNC: 27 PG
MCHC RBC-ENTMCNC: 32.3 GM/DL
MCV RBC AUTO: 83.5 FL
MONOCYTES # BLD AUTO: 0.3 K/UL
MONOCYTES NFR BLD AUTO: 6.6 %
NEUTROPHILS # BLD AUTO: 2.76 K/UL
NEUTROPHILS NFR BLD AUTO: 60.6 %
NONHDLC SERPL-MCNC: 148 MG/DL
PLATELET # BLD AUTO: 267 K/UL
POTASSIUM SERPL-SCNC: 4.9 MMOL/L
PROT SERPL-MCNC: 6.7 G/DL
RBC # BLD: 5.15 M/UL
RBC # FLD: 12.9 %
SODIUM SERPL-SCNC: 138 MMOL/L
T4 SERPL-MCNC: 9.6 UG/DL
TRIGL SERPL-MCNC: 84 MG/DL
TSH SERPL-ACNC: 0.98 UIU/ML
WBC # FLD AUTO: 4.56 K/UL

## 2022-09-18 ENCOUNTER — NON-APPOINTMENT (OUTPATIENT)
Age: 62
End: 2022-09-18

## 2022-09-22 ENCOUNTER — APPOINTMENT (OUTPATIENT)
Dept: RADIOLOGY | Facility: CLINIC | Age: 62
End: 2022-09-22

## 2022-09-22 ENCOUNTER — APPOINTMENT (OUTPATIENT)
Dept: ULTRASOUND IMAGING | Facility: CLINIC | Age: 62
End: 2022-09-22

## 2022-09-22 ENCOUNTER — APPOINTMENT (OUTPATIENT)
Dept: MAMMOGRAPHY | Facility: CLINIC | Age: 62
End: 2022-09-22

## 2022-09-22 PROCEDURE — 76641 ULTRASOUND BREAST COMPLETE: CPT | Mod: 50

## 2022-09-22 PROCEDURE — 77063 BREAST TOMOSYNTHESIS BI: CPT

## 2022-09-22 PROCEDURE — 77067 SCR MAMMO BI INCL CAD: CPT

## 2022-09-22 PROCEDURE — 77080 DXA BONE DENSITY AXIAL: CPT

## 2022-10-17 ENCOUNTER — RESULT REVIEW (OUTPATIENT)
Age: 62
End: 2022-10-17

## 2022-11-14 ENCOUNTER — OUTPATIENT (OUTPATIENT)
Dept: OUTPATIENT SERVICES | Facility: HOSPITAL | Age: 62
LOS: 1 days | End: 2022-11-14
Payer: COMMERCIAL

## 2022-11-14 ENCOUNTER — APPOINTMENT (OUTPATIENT)
Dept: RADIOLOGY | Facility: IMAGING CENTER | Age: 62
End: 2022-11-14

## 2022-11-14 DIAGNOSIS — Z00.00 ENCOUNTER FOR GENERAL ADULT MEDICAL EXAMINATION WITHOUT ABNORMAL FINDINGS: ICD-10-CM

## 2022-11-14 PROCEDURE — 71046 X-RAY EXAM CHEST 2 VIEWS: CPT

## 2022-11-14 PROCEDURE — 71046 X-RAY EXAM CHEST 2 VIEWS: CPT | Mod: 26

## 2022-12-20 ENCOUNTER — APPOINTMENT (OUTPATIENT)
Dept: MRI IMAGING | Facility: IMAGING CENTER | Age: 62
End: 2022-12-20

## 2022-12-20 ENCOUNTER — OUTPATIENT (OUTPATIENT)
Dept: OUTPATIENT SERVICES | Facility: HOSPITAL | Age: 62
LOS: 1 days | End: 2022-12-20
Payer: COMMERCIAL

## 2022-12-20 DIAGNOSIS — M47.817 SPONDYLOSIS WITHOUT MYELOPATHY OR RADICULOPATHY, LUMBOSACRAL REGION: ICD-10-CM

## 2022-12-20 PROCEDURE — 72146 MRI CHEST SPINE W/O DYE: CPT | Mod: 26

## 2022-12-20 PROCEDURE — 72141 MRI NECK SPINE W/O DYE: CPT

## 2022-12-20 PROCEDURE — 72141 MRI NECK SPINE W/O DYE: CPT | Mod: 26

## 2022-12-20 PROCEDURE — 72148 MRI LUMBAR SPINE W/O DYE: CPT | Mod: 26

## 2022-12-20 PROCEDURE — 72148 MRI LUMBAR SPINE W/O DYE: CPT

## 2022-12-20 PROCEDURE — 72146 MRI CHEST SPINE W/O DYE: CPT

## 2023-01-20 DIAGNOSIS — R93.7 ABNORMAL FINDINGS ON DIAGNOSTIC IMAGING OF OTHER PARTS OF MUSCULOSKELETAL SYSTEM: ICD-10-CM

## 2023-01-23 LAB
BASOPHILS # BLD AUTO: 0.04 K/UL
BASOPHILS NFR BLD AUTO: 0.7 %
EOSINOPHIL # BLD AUTO: 0.17 K/UL
EOSINOPHIL NFR BLD AUTO: 3 %
ERYTHROCYTE [SEDIMENTATION RATE] IN BLOOD BY WESTERGREN METHOD: 34 MM/HR
HCT VFR BLD CALC: 48.6 %
HGB BLD-MCNC: 15.2 G/DL
IMM GRANULOCYTES NFR BLD AUTO: 0.2 %
LYMPHOCYTES # BLD AUTO: 1.44 K/UL
LYMPHOCYTES NFR BLD AUTO: 25 %
MAN DIFF?: NORMAL
MCHC RBC-ENTMCNC: 27 PG
MCHC RBC-ENTMCNC: 31.3 GM/DL
MCV RBC AUTO: 86.2 FL
MONOCYTES # BLD AUTO: 0.41 K/UL
MONOCYTES NFR BLD AUTO: 7.1 %
NEUTROPHILS # BLD AUTO: 3.68 K/UL
NEUTROPHILS NFR BLD AUTO: 64 %
PLATELET # BLD AUTO: 283 K/UL
RBC # BLD: 5.64 M/UL
RBC # FLD: 13.2 %
WBC # FLD AUTO: 5.75 K/UL

## 2023-01-24 ENCOUNTER — APPOINTMENT (OUTPATIENT)
Dept: HEPATOLOGY | Facility: CLINIC | Age: 63
End: 2023-01-24

## 2023-01-25 LAB
ALBUMIN MFR SERPL ELPH: 61.5 %
ALBUMIN SERPL-MCNC: 4.4 G/DL
ALBUMIN/GLOB SERPL: 1.6 RATIO
ALPHA1 GLOB MFR SERPL ELPH: 4.6 %
ALPHA1 GLOB SERPL ELPH-MCNC: 0.3 G/DL
ALPHA2 GLOB MFR SERPL ELPH: 9.1 %
ALPHA2 GLOB SERPL ELPH-MCNC: 0.6 G/DL
B-GLOBULIN MFR SERPL ELPH: 11.9 %
B-GLOBULIN SERPL ELPH-MCNC: 0.8 G/DL
GAMMA GLOB FLD ELPH-MCNC: 0.9 G/DL
GAMMA GLOB MFR SERPL ELPH: 12.9 %
INTERPRETATION SERPL IEP-IMP: NORMAL
M PROTEIN SPEC IFE-MCNC: NORMAL
PROT SERPL-MCNC: 7.1 G/DL
PROT SERPL-MCNC: 7.1 G/DL

## 2023-03-28 ENCOUNTER — APPOINTMENT (OUTPATIENT)
Dept: HEPATOLOGY | Facility: CLINIC | Age: 63
End: 2023-03-28
Payer: COMMERCIAL

## 2023-03-28 VITALS
HEART RATE: 69 BPM | OXYGEN SATURATION: 97 % | RESPIRATION RATE: 16 BRPM | BODY MASS INDEX: 31.92 KG/M2 | HEIGHT: 70 IN | DIASTOLIC BLOOD PRESSURE: 83 MMHG | TEMPERATURE: 97.8 F | SYSTOLIC BLOOD PRESSURE: 152 MMHG | WEIGHT: 223 LBS

## 2023-03-28 PROCEDURE — 99214 OFFICE O/P EST MOD 30 MIN: CPT

## 2023-03-29 LAB
ALBUMIN SERPL ELPH-MCNC: 4.6 G/DL
ALP BLD-CCNC: 114 U/L
ALT SERPL-CCNC: 35 U/L
ANION GAP SERPL CALC-SCNC: 11 MMOL/L
AST SERPL-CCNC: 23 U/L
BASOPHILS # BLD AUTO: 0.04 K/UL
BASOPHILS NFR BLD AUTO: 0.8 %
BILIRUB SERPL-MCNC: 0.4 MG/DL
BUN SERPL-MCNC: 16 MG/DL
CALCIUM SERPL-MCNC: 10.4 MG/DL
CHLORIDE SERPL-SCNC: 102 MMOL/L
CHOLEST SERPL-MCNC: 197 MG/DL
CO2 SERPL-SCNC: 25 MMOL/L
CREAT SERPL-MCNC: 0.74 MG/DL
EGFR: 90 ML/MIN/1.73M2
EOSINOPHIL # BLD AUTO: 0.14 K/UL
EOSINOPHIL NFR BLD AUTO: 2.7 %
ESTIMATED AVERAGE GLUCOSE: 111 MG/DL
GLUCOSE SERPL-MCNC: 95 MG/DL
HBA1C MFR BLD HPLC: 5.5 %
HCT VFR BLD CALC: 47.2 %
HDLC SERPL-MCNC: 53 MG/DL
HGB BLD-MCNC: 14.8 G/DL
IMM GRANULOCYTES NFR BLD AUTO: 0.4 %
INR PPP: 0.95 RATIO
LDLC SERPL CALC-MCNC: 121 MG/DL
LYMPHOCYTES # BLD AUTO: 1.52 K/UL
LYMPHOCYTES NFR BLD AUTO: 29.1 %
MAN DIFF?: NORMAL
MCHC RBC-ENTMCNC: 26.8 PG
MCHC RBC-ENTMCNC: 31.4 GM/DL
MCV RBC AUTO: 85.4 FL
MONOCYTES # BLD AUTO: 0.39 K/UL
MONOCYTES NFR BLD AUTO: 7.5 %
NEUTROPHILS # BLD AUTO: 3.12 K/UL
NEUTROPHILS NFR BLD AUTO: 59.5 %
NONHDLC SERPL-MCNC: 144 MG/DL
PLATELET # BLD AUTO: 279 K/UL
POTASSIUM SERPL-SCNC: 4.4 MMOL/L
PROT SERPL-MCNC: 7 G/DL
PT BLD: 11 SEC
RBC # BLD: 5.53 M/UL
RBC # FLD: 13.2 %
SODIUM SERPL-SCNC: 138 MMOL/L
TRIGL SERPL-MCNC: 114 MG/DL
WBC # FLD AUTO: 5.23 K/UL

## 2023-04-03 ENCOUNTER — NON-APPOINTMENT (OUTPATIENT)
Age: 63
End: 2023-04-03

## 2023-04-04 NOTE — ASSESSMENT
[FreeTextEntry1] : # Nonalcoholic (metabolic) fatty liver disease (NAFLD):\par \par Her main risk factor for NAFLD is obesity and mild hypercholesterolemia (on labs from 9/2022, not yet on medication). No prediabetes or DM2 on labs from 9/2022, with HbA1c 5.6%. She has no significant alcohol history. Prior laboratory work-up for other causes of chronic liver disease was unremarkable apart from a weakly positive ASMA with 1:20 titer but with KATIE negative, IgG within normal limits, and low clinical suspicion for autoimmune hepatitis including based on her most recent labs that showed only minimally elevated ALT and normal AST. Ferritin was mildly elevated as is often seen with nonalcoholic steatohepatitis (BOSWELL), with no suspicion for hemochromatosis or hemosiderosis given normal transferrin saturation.\par \par Based on her prior MR elastography (1/6/22), she had severe steatosis with hepatic fat fraction 21% but no significant hepatic fibrosis (F0-1). Repeat MR elastography ordered today for surveillance.\par \par We discussed the diagnosis of NAFLD at length. I reviewed the natural history, evaluation and staging of the disease, including her MR elastography results in detail. We discussed the prognosis, including possible risks of development of compensated cirrhosis, decompensated cirrhosis, and hepatocellular carcinoma (HCC) as well as increased risks of diabetes mellitus, chronic kidney disease, and cardiovascular disease.\par \par We discussed that NAFLD is potentially reversible and that lifestyle modifications are crucial for management of NAFLD. I recommend to continue gradual weight loss of at least 10% of her body weight (20-25 lbs) with a goal weight of 200 lbs by next year. I recommended coffee consumption (up to 3-4 cups/day if desired), adherence to a Mediterranean style diet with increased consumption of vegetables and lean proteins (with the plate method discussed), avoidance of red meat and high fructose corn syrup, and avoidance of calorie-containing beverages. I encouraged her to consider dietary tracking such as with a smart phone ariel. I also recommended moderate intensity exercise for a minimum of 20-30 minutes at least 3 times per week. These changes have been shown to lead to regression or even resolution of steatosis, inflammation, and even fibrosis in some patients.\par \par We discussed that currently, there are no FDA-approved pharmacologic treatments for NAFLD, but that this may change within the next 1-2 years as a number of promising drugs are currently being investigated in clinical trials. We briefly discussed that we may consider pharmacologic therapy in the future either off label or through a clinical trial if she has evidence of progressive liver disease based on her labs and elastography, especially if not improving with ongoing lifestyle modifications and weight loss.\par \par # Pancreatic cystic lesion:\par - MR elastography (1/6/22) showed a 1.1 cm pancreatic tail cyst with no high risk radiologic features.\par - She is overdue for annual surveillance but with MRI ordered as part of MR elastography, as above.\par \par # Health maintenance:\par - HAV and HBV non immune, will discuss receiving vaccines at next follow up. \par - Last colonoscopy ~5 years ago with GI Dr. Birmingham. She was advised to follow-up with him for surveillance and given chronic diarrhea likely related to IBS-D and lactose intolerance. If symptoms worsen despite dietary modifications, she may benefit from pharmacologic therapy as well as possible EGD/colonoscopy for re-evaluation and duodenal, ileal, and colonic biopsies (if not done previously).\par \par Next follow-up: 1  year or earlier as needed

## 2023-04-04 NOTE — HISTORY OF PRESENT ILLNESS
[de-identified] : Ms. Newsome is a 63 yo F with obesity, prior thyroidectomy due to multinodular goiter, history of diverticulitis, history of C diff colitis, IBS-D with lactose intolerance, pancreatic cystic lesion, and nonalcoholic (metabolic) fatty liver disease (NAFLD).\par \par PCP / Cardiology: Dr. Kannan Almanza = referring physician\par Hematology: Dr. Mau Clement\par Gastroenterologist: Dr. Birmingham \par \par CT abdomen/pelvis with contrast (9/15/18): Steatosis. Normal spleen. Acute uncomplicated sigmoid colon diverticulitis.\par \par Abdominal US (21, [facility]): Limited exam due to body habitus and increased abdominal bowel gas. Borderline hepatomegalyt (18 cm) with diffusely increased echogenicity suggesting steatosis. No focal hepatic lesion. Splenomegaly (14.1 cm). Main portal vein patent with normal direction of flow. No ascites.\par \par MRI abdomen with and without contrast with MR elastography (22): Normal liver morphology. Diffuse hepatic steatosis (with hepatic fat fraction 21%, consistent with severe steatosis). No hepatic iron deposition. Hepatic stiffness 2.0 kPA (normal). Normal spleen. No ascites. Patent hepatic and portal veins. Few subcentimeter renal cysts. 1.1 cm simple pancreatic tail cyst with normal caliber main pancreatic duct.\par \par She was last seen in the office on 2022 (>1 year ago) and is here today for follow-up. She reports that she has had episodes of diarrhea for several years now but it seems to have worsened this past January. She has made several dietary changes -- first by adhering to a strict diverticulitis diet and then loosening some of those restrictions but adhering to a lactose-free diet including switching to coconut milk based products -- and has seen significant improvement in her symptoms. She has had minimal exercise over the winter, but plans on walking outdoors once the weather permits.  \par \par She had a colonoscopy ~ 5 years ago with GI Dr. Birmingham and says it was normal apart from diverticulosis. No prior EGD.\par \par She has no known family history of any liver disease. Her mother is in her 90s and has Afib, diabetes, and traumatic brain injury from a pedestrian accident. Her father  of colon cancer (diagnosed at age 61) and had had prior cholecystectomy. Her brother  at age 57 of a massive MI and had preceding hypertension. Her 38-year-old daughter is healthy.\par \par She lives with her . 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 Never smoker. No recreational drug use. [FreeTextEntry1] : - COL with Dr. Birmingham ~ 5 years ago\par - episodes of diarrhea (worse in Jan) \par > started lactose free diet  w/ improvement in symptoms \par \par \par \par PLAN:\par -MRE / MRI - surveillance for pancreatic cyst - pending\par > will discuss NAFLD treatment options (vitamain E / ozempic) once results are in   \par -recommend seeing Dr. Birmingham for repeat EGD/COL \par -labs ordered today \par \par \par

## 2023-04-11 ENCOUNTER — APPOINTMENT (OUTPATIENT)
Dept: SURGERY | Facility: CLINIC | Age: 63
End: 2023-04-11
Payer: COMMERCIAL

## 2023-04-11 PROCEDURE — 36415 COLL VENOUS BLD VENIPUNCTURE: CPT

## 2023-04-11 PROCEDURE — 99213 OFFICE O/P EST LOW 20 MIN: CPT

## 2023-04-11 NOTE — ASSESSMENT
[FreeTextEntry1] : s/p Thyroidectomy\par hypothyroidism\par labs today\par will determine Synthroid dose after lab results\par f/u 1 year sooner if needed

## 2023-04-11 NOTE — PHYSICAL EXAM
[de-identified] : well healed scar [Midline] : located in midline position [Normal] : orientation to person, place, and time: normal

## 2023-04-11 NOTE — HISTORY OF PRESENT ILLNESS
[de-identified] : Pt 14 years s/p thyroidectomy for benign disease feels well on Synthroid 112 mcg daily. Pt denies any changes medically since last visit

## 2023-04-12 ENCOUNTER — NON-APPOINTMENT (OUTPATIENT)
Age: 63
End: 2023-04-12

## 2023-04-12 LAB
T3 SERPL-MCNC: 110 NG/DL
T4 FREE SERPL-MCNC: 1.7 NG/DL
THYROGLOB AB SERPL-ACNC: <20 IU/ML
THYROPEROXIDASE AB SERPL IA-ACNC: <10 IU/ML
TSH SERPL-ACNC: 0.8 UIU/ML

## 2023-04-14 ENCOUNTER — NON-APPOINTMENT (OUTPATIENT)
Age: 63
End: 2023-04-14

## 2023-05-05 ENCOUNTER — APPOINTMENT (OUTPATIENT)
Dept: ULTRASOUND IMAGING | Facility: CLINIC | Age: 63
End: 2023-05-05
Payer: COMMERCIAL

## 2023-05-05 ENCOUNTER — APPOINTMENT (OUTPATIENT)
Dept: MAMMOGRAPHY | Facility: CLINIC | Age: 63
End: 2023-05-05
Payer: COMMERCIAL

## 2023-05-05 PROCEDURE — G0279: CPT | Mod: LT

## 2023-05-05 PROCEDURE — 76641 ULTRASOUND BREAST COMPLETE: CPT | Mod: LT

## 2023-05-05 PROCEDURE — 77065 DX MAMMO INCL CAD UNI: CPT | Mod: RT

## 2023-05-12 RX ORDER — MELOXICAM 15 MG/1
15 TABLET ORAL
Qty: 60 | Refills: 1 | Status: ACTIVE | COMMUNITY
Start: 2023-05-12 | End: 1900-01-01

## 2023-09-11 ENCOUNTER — RX RENEWAL (OUTPATIENT)
Age: 63
End: 2023-09-11

## 2023-09-22 RX ORDER — NYSTATIN 100000 [USP'U]/G
100000 POWDER TOPICAL
Qty: 3 | Refills: 7 | Status: ACTIVE | COMMUNITY
Start: 2023-09-22 | End: 1900-01-01

## 2023-09-22 RX ORDER — NYSTATIN AND TRIAMCINOLONE ACETONIDE 100000; 1 MG/G; MG/G
100000-0.1 CREAM TOPICAL 3 TIMES DAILY
Qty: 3 | Refills: 0 | Status: ACTIVE | COMMUNITY
Start: 2023-09-22 | End: 1900-01-01

## 2023-10-04 DIAGNOSIS — U07.1 COVID-19: ICD-10-CM

## 2023-10-25 ENCOUNTER — LABORATORY RESULT (OUTPATIENT)
Age: 63
End: 2023-10-25

## 2023-10-25 DIAGNOSIS — R39.9 UNSPECIFIED SYMPTOMS AND SIGNS INVOLVING THE GENITOURINARY SYSTEM: ICD-10-CM

## 2023-10-25 LAB
APPEARANCE: CLEAR
BILIRUBIN URINE: NEGATIVE
BLOOD URINE: NEGATIVE
COLOR: YELLOW
GLUCOSE QUALITATIVE U: NEGATIVE MG/DL
KETONES URINE: NEGATIVE MG/DL
LEUKOCYTE ESTERASE URINE: ABNORMAL
NITRITE URINE: NEGATIVE
PH URINE: 6
PROTEIN URINE: NEGATIVE MG/DL
SPECIFIC GRAVITY URINE: 1.01
UROBILINOGEN URINE: 0.2 MG/DL

## 2023-12-07 ENCOUNTER — RX RENEWAL (OUTPATIENT)
Age: 63
End: 2023-12-07

## 2023-12-12 ENCOUNTER — APPOINTMENT (OUTPATIENT)
Dept: CARDIOLOGY | Facility: CLINIC | Age: 63
End: 2023-12-12
Payer: MEDICARE

## 2023-12-12 ENCOUNTER — NON-APPOINTMENT (OUTPATIENT)
Age: 63
End: 2023-12-12

## 2023-12-12 VITALS
BODY MASS INDEX: 31.71 KG/M2 | DIASTOLIC BLOOD PRESSURE: 90 MMHG | SYSTOLIC BLOOD PRESSURE: 165 MMHG | OXYGEN SATURATION: 98 % | HEART RATE: 80 BPM | WEIGHT: 221 LBS

## 2023-12-12 DIAGNOSIS — R03.0 ELEVATED BLOOD-PRESSURE READING, W/OUT DIAGNOSIS OF HYPERTENSION: ICD-10-CM

## 2023-12-12 PROCEDURE — 93000 ELECTROCARDIOGRAM COMPLETE: CPT

## 2023-12-12 PROCEDURE — 99214 OFFICE O/P EST MOD 30 MIN: CPT

## 2023-12-12 RX ORDER — AMLODIPINE BESYLATE 5 MG/1
5 TABLET ORAL
Qty: 90 | Refills: 3 | Status: ACTIVE | COMMUNITY
Start: 2023-12-12 | End: 1900-01-01

## 2023-12-13 NOTE — HISTORY OF PRESENT ILLNESS
[FreeTextEntry1] : 12/12/2023 Martha Newsome returns today for evaluation of elevated blood pressure. Yesterday she was at the office of Jennifer Birmingham MD out of concern for a UTI and was found to have a blood pressure of 170/101 mm Hg and she was advised to follow up in this office. She was prescribed nitrofurantoin but has not yet started the medications. Today she reports feeling fair overall. She has been increasingly frustrated with her  and recently told him that therapy will be necessary as they attempt to work through the issues. She is also stressed about caring for her mother who resides in a skilled nursing facility. As she is extremely busy tending to the needs of her family, she has not been exercising but is interested in the possibility of weight loss medications.

## 2024-01-16 ENCOUNTER — APPOINTMENT (OUTPATIENT)
Dept: CARDIOLOGY | Facility: CLINIC | Age: 64
End: 2024-01-16
Payer: MEDICARE

## 2024-01-16 ENCOUNTER — NON-APPOINTMENT (OUTPATIENT)
Age: 64
End: 2024-01-16

## 2024-01-16 VITALS — HEART RATE: 79 BPM | DIASTOLIC BLOOD PRESSURE: 84 MMHG | OXYGEN SATURATION: 95 % | SYSTOLIC BLOOD PRESSURE: 144 MMHG

## 2024-01-16 VITALS — BODY MASS INDEX: 31.71 KG/M2 | WEIGHT: 221 LBS

## 2024-01-16 DIAGNOSIS — R00.2 PALPITATIONS: ICD-10-CM

## 2024-01-16 PROCEDURE — 93000 ELECTROCARDIOGRAM COMPLETE: CPT

## 2024-01-16 PROCEDURE — 99214 OFFICE O/P EST MOD 30 MIN: CPT

## 2024-01-16 NOTE — HISTORY OF PRESENT ILLNESS
[FreeTextEntry1] : 12/12/2023 Martha Newsome returns today for evaluation of elevated blood pressure. Yesterday she was at the office of Jennifer Birmingham MD out of concern for a UTI and was found to have a blood pressure of 170/101 mm Hg and she was advised to follow up in this office. She was prescribed nitrofurantoin but has not yet started the medications. Today she reports feeling fair overall. She has been increasingly frustrated with her  and recently told him that therapy will be necessary as they attempt to work through the issues. She is also stressed about caring for her mother who resides in a skilled nursing facility. As she is extremely busy tending to the needs of her family, she has not been exercising but is interested in the possibility of weight loss medications.   1/16/2024 Martha returns today for follow up of her elevated blood pressure readings. She bought a home BP monitor at [employer] about 3 weeks ago and started amlodipine 5 mg 2 weeks ago. Her home readings have been very elevated and I question the accuracy of her machine as she shows a log with a reading of 186 diastolically. Today in the office her machine reads 177/104 mm Hg. Per manual assessment she is found to be at 134/72 mm Hg. She continues to have ongoing stress in the home regarding her  and occasionally her daughter. She also voices frustration for recent weight gain and is seen today at 229 pounds.

## 2024-01-18 ENCOUNTER — APPOINTMENT (OUTPATIENT)
Dept: ULTRASOUND IMAGING | Facility: CLINIC | Age: 64
End: 2024-01-18
Payer: MEDICARE

## 2024-01-18 ENCOUNTER — APPOINTMENT (OUTPATIENT)
Dept: MAMMOGRAPHY | Facility: CLINIC | Age: 64
End: 2024-01-18
Payer: MEDICARE

## 2024-01-18 PROCEDURE — 76641 ULTRASOUND BREAST COMPLETE: CPT | Mod: 50,GY

## 2024-01-18 PROCEDURE — 77063 BREAST TOMOSYNTHESIS BI: CPT

## 2024-01-18 PROCEDURE — 77067 SCR MAMMO BI INCL CAD: CPT

## 2024-02-21 ENCOUNTER — APPOINTMENT (OUTPATIENT)
Dept: CARDIOLOGY | Facility: CLINIC | Age: 64
End: 2024-02-21
Payer: MEDICARE

## 2024-02-21 VITALS
HEIGHT: 70 IN | WEIGHT: 219 LBS | BODY MASS INDEX: 31.35 KG/M2 | HEART RATE: 72 BPM | SYSTOLIC BLOOD PRESSURE: 140 MMHG | OXYGEN SATURATION: 99 % | DIASTOLIC BLOOD PRESSURE: 88 MMHG

## 2024-02-21 VITALS — SYSTOLIC BLOOD PRESSURE: 138 MMHG | DIASTOLIC BLOOD PRESSURE: 86 MMHG

## 2024-02-21 PROCEDURE — 99214 OFFICE O/P EST MOD 30 MIN: CPT

## 2024-02-25 LAB
25(OH)D3 SERPL-MCNC: 20.1 NG/ML
ALBUMIN SERPL ELPH-MCNC: 4.8 G/DL
ALP BLD-CCNC: 115 U/L
ALT SERPL-CCNC: 36 U/L
ANION GAP SERPL CALC-SCNC: 11 MMOL/L
AST SERPL-CCNC: 25 U/L
BILIRUB SERPL-MCNC: 0.5 MG/DL
BUN SERPL-MCNC: 18 MG/DL
CALCIUM SERPL-MCNC: 10.7 MG/DL
CHLORIDE SERPL-SCNC: 100 MMOL/L
CHOLEST SERPL-MCNC: 191 MG/DL
CO2 SERPL-SCNC: 24 MMOL/L
CREAT SERPL-MCNC: 0.82 MG/DL
EGFR: 79 ML/MIN/1.73M2
ESTIMATED AVERAGE GLUCOSE: 108 MG/DL
GLUCOSE SERPL-MCNC: 92 MG/DL
HBA1C MFR BLD HPLC: 5.4 %
HDLC SERPL-MCNC: 59 MG/DL
LDLC SERPL CALC-MCNC: 118 MG/DL
NONHDLC SERPL-MCNC: 132 MG/DL
POTASSIUM SERPL-SCNC: 4.7 MMOL/L
PROT SERPL-MCNC: 7.1 G/DL
SODIUM SERPL-SCNC: 135 MMOL/L
T4 SERPL-MCNC: 10.3 UG/DL
TRIGL SERPL-MCNC: 78 MG/DL
TSH SERPL-ACNC: 1.99 UIU/ML

## 2024-02-25 NOTE — REASON FOR VISIT
[FreeTextEntry1] : 2/21/2024 Martha Newsome returns today for routine scheduled follow up. She has been feeling in her usual state of health. She and her  are planning to fly to [place] tomorrow. After they return to [place], she will be going back alone to visit with relatives. After starting amlodipine she has been checking her blood pressure regularly and is getting variable readings per her report. Her biggest concern today is for her sleeping habits. On the typical day she goes to bed early around 7:30 to 8 pm. Sometime near 1 or 2 am she wakes up and often has a snack. She then sleeps until 8 in the morning or so. Otherwise she has no complaints. She is requesting a letter for her  to allow him to travel with his insulin/Mounjaro on the flight.

## 2024-02-28 DIAGNOSIS — J20.9 ACUTE BRONCHITIS, UNSPECIFIED: ICD-10-CM

## 2024-03-07 ENCOUNTER — RX RENEWAL (OUTPATIENT)
Age: 64
End: 2024-03-07

## 2024-03-26 ENCOUNTER — APPOINTMENT (OUTPATIENT)
Dept: CARDIOLOGY | Facility: CLINIC | Age: 64
End: 2024-03-26
Payer: MEDICARE

## 2024-03-26 ENCOUNTER — NON-APPOINTMENT (OUTPATIENT)
Age: 64
End: 2024-03-26

## 2024-03-26 VITALS
BODY MASS INDEX: 30.64 KG/M2 | SYSTOLIC BLOOD PRESSURE: 118 MMHG | WEIGHT: 214 LBS | OXYGEN SATURATION: 98 % | HEART RATE: 77 BPM | HEIGHT: 70 IN | DIASTOLIC BLOOD PRESSURE: 76 MMHG

## 2024-03-26 DIAGNOSIS — I10 ESSENTIAL (PRIMARY) HYPERTENSION: ICD-10-CM

## 2024-03-26 DIAGNOSIS — K76.0 FATTY (CHANGE OF) LIVER, NOT ELSEWHERE CLASSIFIED: ICD-10-CM

## 2024-03-26 DIAGNOSIS — Z00.00 ENCOUNTER FOR GENERAL ADULT MEDICAL EXAMINATION W/OUT ABNORMAL FINDINGS: ICD-10-CM

## 2024-03-26 DIAGNOSIS — E66.9 OBESITY, UNSPECIFIED: ICD-10-CM

## 2024-03-26 PROCEDURE — 99214 OFFICE O/P EST MOD 30 MIN: CPT

## 2024-03-26 PROCEDURE — G2211 COMPLEX E/M VISIT ADD ON: CPT

## 2024-03-26 RX ORDER — NIRMATRELVIR AND RITONAVIR 300-100 MG
20 X 150 MG & KIT ORAL
Qty: 1 | Refills: 0 | Status: DISCONTINUED | COMMUNITY
Start: 2023-10-04 | End: 2024-03-26

## 2024-03-26 RX ORDER — TIRZEPATIDE 7.5 MG/.5ML
7.5 INJECTION, SOLUTION SUBCUTANEOUS
Qty: 3 | Refills: 3 | Status: ACTIVE | COMMUNITY
Start: 2024-03-26 | End: 1900-01-01

## 2024-03-26 RX ORDER — AZITHROMYCIN 250 MG/1
250 TABLET, FILM COATED ORAL
Qty: 1 | Refills: 3 | Status: DISCONTINUED | COMMUNITY
Start: 2024-02-28 | End: 2024-03-26

## 2024-03-26 NOTE — REASON FOR VISIT
[FreeTextEntry1] : The patient is here today for follow-up of hypertension, hypothyroidism, hypercholesterolemia and a fatty liver.  The patient has been using her 's Mounjaro and has lost about 12 pounds.  She is generally feeling well with no complaints.  She is somewhat concerned about her fatty liver.  Recent laboratory data are all within acceptable limits including LFTs except for a low vitamin D.  The patient will start taking vitamin D.

## 2024-03-26 NOTE — PHYSICAL EXAM
[TextEntry] : General/Constitutional: WD/ WN in NAD H: NC/AT Eyes:  PERRL, sclerae and conjunctivae normal without jaundice or xanthelasma; ENMT:normal teeth, gums and palate with no petechiae, pallor or cyanosis Neck: w/o JVD, thyromegaly or adenopathy; normal venous contour Respiratory: clear to auscultation, normal respiratory effort with no retractions or use of accessory respiratory muscles Heart: [id number], regular rate, normal S1, S2 without murmurs, rubs, gallops, heaves or thrills Vascular exam: normal carotid upstrokes without carotid or abdominal bruits. 2+/2+ pulses to posterior tibialis and dorsalis pedis Abdomen: soft, nontender, bowels sounds normal without hepatomegaly or splenomegaly, masses or bruits Musculoskeletal:without significant kyphosis or scoliosis Extremities: w/o CCE, good capillary filling Skin: no stasis changes; no ulcers  Neuro: AA and O x 3; no focal neurologic deficits Psych: normal mood and affect

## 2024-03-26 NOTE — DISCUSSION/SUMMARY
[FreeTextEntry1] : Hypertension-well-controlled Hypothyroidism, hypercalcemia-laboratory data to be drawn Fatty liver-follow-up elastography in 4 months. Return visit 4 months  Total time of the encounter: 30 minutes which included but was not limited to the following: Face-to-face and non face-to-face time personally spent by the physician preparing to see the patient, obtaining and/or resuming separately obtained history, performing a medically appropriate examination and/or evaluation, counseling and educating the patient/family/caregiver, ordering medications, tests or procedures, referring and communicating with other healthcare professionals, documenting clinical information in the electronic health record, independently interpreting results and communicated results to the patient/family/caregiver and care coordination.

## 2024-04-03 ENCOUNTER — APPOINTMENT (OUTPATIENT)
Dept: CARDIOLOGY | Facility: CLINIC | Age: 64
End: 2024-04-03

## 2024-05-28 ENCOUNTER — NON-APPOINTMENT (OUTPATIENT)
Age: 64
End: 2024-05-28

## 2024-05-28 ENCOUNTER — APPOINTMENT (OUTPATIENT)
Dept: SURGERY | Facility: CLINIC | Age: 64
End: 2024-05-28
Payer: MEDICARE

## 2024-05-28 DIAGNOSIS — E03.9 HYPOTHYROIDISM, UNSPECIFIED: ICD-10-CM

## 2024-05-28 LAB
25(OH)D3 SERPL-MCNC: 15 NG/ML
CALCIUM SERPL-MCNC: 9.8 MG/DL
CALCIUM SERPL-MCNC: 9.8 MG/DL
PARATHYROID HORMONE INTACT: 91 PG/ML
T3 SERPL-MCNC: 96 NG/DL
T4 FREE SERPL-MCNC: 1.6 NG/DL
TSH SERPL-ACNC: 1.94 UIU/ML

## 2024-05-28 PROCEDURE — 36415 COLL VENOUS BLD VENIPUNCTURE: CPT

## 2024-05-28 PROCEDURE — 99213 OFFICE O/P EST LOW 20 MIN: CPT

## 2024-05-28 NOTE — PHYSICAL EXAM
[de-identified] : well healed scar [Midline] : located in midline position [Normal] : orientation to person, place, and time: normal

## 2024-05-28 NOTE — ASSESSMENT
[FreeTextEntry1] : s/p Thyroidectomy Hypercalcemia daily care continue Synthroid labs in office f/u 1 year or sooner

## 2024-05-28 NOTE — HISTORY OF PRESENT ILLNESS
[de-identified] : Pt 15 years s/p thyroidectomy for benign disease feels well on synthroid 112 mcg recent Ca 10.7 denies bone pain, constipation fatigue or kidney stones

## 2024-05-29 ENCOUNTER — NON-APPOINTMENT (OUTPATIENT)
Age: 64
End: 2024-05-29

## 2024-05-29 LAB
THYROGLOB AB SERPL-ACNC: <20 IU/ML
THYROPEROXIDASE AB SERPL IA-ACNC: <10 IU/ML

## 2024-05-31 DIAGNOSIS — E83.52 HYPERCALCEMIA: ICD-10-CM

## 2024-06-03 ENCOUNTER — RX RENEWAL (OUTPATIENT)
Age: 64
End: 2024-06-03

## 2024-06-03 RX ORDER — LEVOTHYROXINE SODIUM 0.11 MG/1
112 TABLET ORAL DAILY
Qty: 90 | Refills: 1 | Status: ACTIVE | COMMUNITY
Start: 2018-05-10 | End: 1900-01-01

## 2024-07-17 ENCOUNTER — APPOINTMENT (OUTPATIENT)
Dept: CARDIOLOGY | Facility: CLINIC | Age: 64
End: 2024-07-17

## 2024-09-06 ENCOUNTER — APPOINTMENT (OUTPATIENT)
Dept: CARDIOLOGY | Facility: CLINIC | Age: 64
End: 2024-09-06
Payer: MEDICARE

## 2024-09-06 ENCOUNTER — NON-APPOINTMENT (OUTPATIENT)
Age: 64
End: 2024-09-06

## 2024-09-06 VITALS
HEART RATE: 75 BPM | OXYGEN SATURATION: 96 % | WEIGHT: 214 LBS | BODY MASS INDEX: 30.71 KG/M2 | SYSTOLIC BLOOD PRESSURE: 140 MMHG | DIASTOLIC BLOOD PRESSURE: 80 MMHG

## 2024-09-06 DIAGNOSIS — E03.9 HYPOTHYROIDISM, UNSPECIFIED: ICD-10-CM

## 2024-09-06 DIAGNOSIS — K76.0 FATTY (CHANGE OF) LIVER, NOT ELSEWHERE CLASSIFIED: ICD-10-CM

## 2024-09-06 DIAGNOSIS — E83.52 HYPERCALCEMIA: ICD-10-CM

## 2024-09-06 DIAGNOSIS — E66.9 OBESITY, UNSPECIFIED: ICD-10-CM

## 2024-09-06 DIAGNOSIS — R39.9 UNSPECIFIED SYMPTOMS AND SIGNS INVOLVING THE GENITOURINARY SYSTEM: ICD-10-CM

## 2024-09-06 PROCEDURE — G2211 COMPLEX E/M VISIT ADD ON: CPT

## 2024-09-06 PROCEDURE — 99214 OFFICE O/P EST MOD 30 MIN: CPT

## 2024-09-06 PROCEDURE — 93000 ELECTROCARDIOGRAM COMPLETE: CPT

## 2024-09-06 NOTE — REASON FOR VISIT
[FreeTextEntry1] : The patient is here today for follow-up of hypertension, hypothyroidism, hypercholesterolemia and a fatty liver.  The patient is generally doing well.  She recently stopped taking Mounjaro because of some diarrhea and burping.  She may restart it.  I explained that these are common side effects and generally tolerable.  She is also concerned about her fatty liver.  She is down about 20 pounds since beginning Mounjaro.  It has been 2 years since her last FibroScan.  Will repeated at this time.  Otherwise, the patient is doing well.

## 2024-09-06 NOTE — PHYSICAL EXAM
[TextEntry] : General/Constitutional: WD/ WN in NAD H: NC/AT Eyes:  PERRL, sclerae and conjunctivae normal without jaundice or xanthelasma; ENMT:normal teeth, gums and palate with no petechiae, pallor or cyanosis Neck: w/o JVD, thyromegaly or adenopathy; normal venous contour Respiratory: clear to auscultation, normal respiratory effort with no retractions or use of accessory respiratory muscles Heart: [id number], regular rate, normal S1, S2 without murmurs, rubs, gallops, heaves or thrills Vascular exam: normal carotid upstrokes without carotid or abdominal bruits. 2+/2+ pulses to posterior tibialis and dorsalis pedis Abdomen: soft, nontender, bowels sounds normal without hepatomegaly or splenomegaly, masses or bruits Musculoskeletal:without significant kyphosis or scoliosis Extremities: w/o CCE, good capillary filling Skin: no stasis changes; no ulcers  Neuro: AA and O x 3; no focal neurologic deficits Psych: normal mood and affect

## 2024-09-06 NOTE — DISCUSSION/SUMMARY
[FreeTextEntry1] : Hypertension-well-controlled Hypothyroidism, hypercalcemia-laboratory data to be drawn Fatty liver-follow-up elastography Return visit 6 months  Total time of the encounter: 30 minutes which included but was not limited to the following: Face-to-face and non face-to-face time personally spent by the physician preparing to see the patient, obtaining and/or resuming separately obtained history, performing a medically appropriate examination and/or evaluation, counseling and educating the patient/family/caregiver, ordering medications, tests or procedures, referring and communicating with other healthcare professionals, documenting clinical information in the electronic health record, independently interpreting results and communicated results to the patient/family/caregiver and care coordination. [EKG obtained to assist in diagnosis and management of assessed problem(s)] : EKG obtained to assist in diagnosis and management of assessed problem(s)

## 2024-09-07 LAB
25(OH)D3 SERPL-MCNC: 37.9 NG/ML
ALBUMIN SERPL ELPH-MCNC: 4.6 G/DL
ALP BLD-CCNC: 102 U/L
ALT SERPL-CCNC: 28 U/L
ANION GAP SERPL CALC-SCNC: 13 MMOL/L
APPEARANCE: CLEAR
AST SERPL-CCNC: 18 U/L
BILIRUB SERPL-MCNC: 0.7 MG/DL
BILIRUBIN URINE: NEGATIVE
BLOOD URINE: NEGATIVE
BUN SERPL-MCNC: 20 MG/DL
CALCIUM SERPL-MCNC: 10.3 MG/DL
CHLORIDE SERPL-SCNC: 97 MMOL/L
CHOLEST SERPL-MCNC: 220 MG/DL
CO2 SERPL-SCNC: 24 MMOL/L
COLOR: YELLOW
CREAT SERPL-MCNC: 0.8 MG/DL
EGFR: 81 ML/MIN/1.73M2
ESTIMATED AVERAGE GLUCOSE: 100 MG/DL
GLUCOSE QUALITATIVE U: NEGATIVE MG/DL
GLUCOSE SERPL-MCNC: 82 MG/DL
HBA1C MFR BLD HPLC: 5.1 %
HCT VFR BLD CALC: 46.4 %
HDLC SERPL-MCNC: 57 MG/DL
HGB BLD-MCNC: 14.1 G/DL
KETONES URINE: NEGATIVE MG/DL
LDLC SERPL CALC-MCNC: 145 MG/DL
LEUKOCYTE ESTERASE URINE: NEGATIVE
MCHC RBC-ENTMCNC: 27 PG
MCHC RBC-ENTMCNC: 30.4 GM/DL
MCV RBC AUTO: 88.7 FL
NITRITE URINE: NEGATIVE
NONHDLC SERPL-MCNC: 163 MG/DL
PH URINE: 6.5
PLATELET # BLD AUTO: 283 K/UL
POTASSIUM SERPL-SCNC: 4.5 MMOL/L
PROT SERPL-MCNC: 6.8 G/DL
PROTEIN URINE: NEGATIVE MG/DL
RBC # BLD: 5.23 M/UL
RBC # FLD: 13.3 %
SODIUM SERPL-SCNC: 134 MMOL/L
SPECIFIC GRAVITY URINE: 1.01
T4 FREE SERPL-MCNC: 1.5 NG/DL
TRIGL SERPL-MCNC: 101 MG/DL
TSH SERPL-ACNC: 1.09 UIU/ML
UROBILINOGEN URINE: 0.2 MG/DL
WBC # FLD AUTO: 5.96 K/UL

## 2024-11-25 ENCOUNTER — RX RENEWAL (OUTPATIENT)
Age: 64
End: 2024-11-25

## 2025-02-05 ENCOUNTER — NON-APPOINTMENT (OUTPATIENT)
Age: 65
End: 2025-02-05

## 2025-02-05 ENCOUNTER — APPOINTMENT (OUTPATIENT)
Dept: CARDIOLOGY | Facility: CLINIC | Age: 65
End: 2025-02-05
Payer: MEDICARE

## 2025-02-05 VITALS
WEIGHT: 210 LBS | BODY MASS INDEX: 30.13 KG/M2 | DIASTOLIC BLOOD PRESSURE: 70 MMHG | SYSTOLIC BLOOD PRESSURE: 130 MMHG | OXYGEN SATURATION: 99 % | HEART RATE: 77 BPM

## 2025-02-05 VITALS — DIASTOLIC BLOOD PRESSURE: 70 MMHG | SYSTOLIC BLOOD PRESSURE: 128 MMHG

## 2025-02-05 DIAGNOSIS — R16.0 HEPATOMEGALY, NOT ELSEWHERE CLASSIFIED: ICD-10-CM

## 2025-02-05 DIAGNOSIS — G47.00 INSOMNIA, UNSPECIFIED: ICD-10-CM

## 2025-02-05 DIAGNOSIS — I10 ESSENTIAL (PRIMARY) HYPERTENSION: ICD-10-CM

## 2025-02-05 DIAGNOSIS — R21 RASH AND OTHER NONSPECIFIC SKIN ERUPTION: ICD-10-CM

## 2025-02-05 DIAGNOSIS — R35.0 FREQUENCY OF MICTURITION: ICD-10-CM

## 2025-02-05 DIAGNOSIS — K76.0 FATTY (CHANGE OF) LIVER, NOT ELSEWHERE CLASSIFIED: ICD-10-CM

## 2025-02-05 DIAGNOSIS — E83.52 HYPERCALCEMIA: ICD-10-CM

## 2025-02-05 PROCEDURE — 99214 OFFICE O/P EST MOD 30 MIN: CPT

## 2025-02-05 PROCEDURE — 93000 ELECTROCARDIOGRAM COMPLETE: CPT

## 2025-02-05 RX ORDER — KETOCONAZOLE 20 MG/G
2 CREAM TOPICAL TWICE DAILY
Qty: 1 | Refills: 1 | Status: ACTIVE | COMMUNITY
Start: 2025-02-05 | End: 1900-01-01

## 2025-02-07 ENCOUNTER — LABORATORY RESULT (OUTPATIENT)
Age: 65
End: 2025-02-07

## 2025-02-12 LAB
25(OH)D3 SERPL-MCNC: 47.4 NG/ML
ALBUMIN SERPL ELPH-MCNC: 4.7 G/DL
ALP BLD-CCNC: 120 U/L
ALT SERPL-CCNC: 26 U/L
ANION GAP SERPL CALC-SCNC: 12 MMOL/L
AST SERPL-CCNC: 22 U/L
BASOPHILS # BLD AUTO: 0.02 K/UL
BASOPHILS NFR BLD AUTO: 0.4 %
BILIRUB SERPL-MCNC: 0.6 MG/DL
BUN SERPL-MCNC: 18 MG/DL
CALCIUM SERPL-MCNC: 10.4 MG/DL
CALCIUM SERPL-MCNC: 10.4 MG/DL
CHLORIDE SERPL-SCNC: 100 MMOL/L
CHOLEST SERPL-MCNC: 208 MG/DL
CO2 SERPL-SCNC: 24 MMOL/L
CREAT SERPL-MCNC: 0.89 MG/DL
EGFR: 71 ML/MIN/1.73M2
EOSINOPHIL # BLD AUTO: 0.14 K/UL
EOSINOPHIL NFR BLD AUTO: 2.7 %
ESTIMATED AVERAGE GLUCOSE: 108 MG/DL
FERRITIN SERPL-MCNC: 328 NG/ML
FOLATE SERPL-MCNC: >20 NG/ML
GLUCOSE SERPL-MCNC: 104 MG/DL
HBA1C MFR BLD HPLC: 5.4 %
HCT VFR BLD CALC: 43.3 %
HDLC SERPL-MCNC: 66 MG/DL
HGB BLD-MCNC: 14.2 G/DL
IMM GRANULOCYTES NFR BLD AUTO: 0.4 %
LDLC SERPL CALC-MCNC: 129 MG/DL
LDLC SERPL DIRECT ASSAY-MCNC: 120 MG/DL
LYMPHOCYTES # BLD AUTO: 1.48 K/UL
LYMPHOCYTES NFR BLD AUTO: 28.3 %
MAGNESIUM SERPL-MCNC: 2.1 MG/DL
MAN DIFF?: NORMAL
MCHC RBC-ENTMCNC: 27.2 PG
MCHC RBC-ENTMCNC: 32.8 G/DL
MCV RBC AUTO: 83 FL
MONOCYTES # BLD AUTO: 0.35 K/UL
MONOCYTES NFR BLD AUTO: 6.7 %
NEUTROPHILS # BLD AUTO: 3.22 K/UL
NEUTROPHILS NFR BLD AUTO: 61.5 %
NONHDLC SERPL-MCNC: 143 MG/DL
PARATHYROID HORMONE INTACT: 83 PG/ML
PHOSPHATE SERPL-MCNC: 3.3 MG/DL
PLATELET # BLD AUTO: 274 K/UL
POTASSIUM SERPL-SCNC: 4.8 MMOL/L
PROT SERPL-MCNC: 7.2 G/DL
RBC # BLD: 5.22 M/UL
RBC # FLD: 13.3 %
SODIUM SERPL-SCNC: 136 MMOL/L
T3 SERPL-MCNC: 102 NG/DL
T4 SERPL-MCNC: 9.2 UG/DL
THYROGLOB AB SERPL-ACNC: 15.3 IU/ML
THYROPEROXIDASE AB SERPL IA-ACNC: 13.4 IU/ML
TRIGL SERPL-MCNC: 80 MG/DL
TSH SERPL-ACNC: 3.15 UIU/ML
VIT B12 SERPL-MCNC: 609 PG/ML
WBC # FLD AUTO: 5.23 K/UL
ZINC SERPL-MCNC: 86 UG/DL

## 2025-02-24 ENCOUNTER — RX RENEWAL (OUTPATIENT)
Age: 65
End: 2025-02-24

## 2025-03-05 ENCOUNTER — APPOINTMENT (OUTPATIENT)
Dept: ULTRASOUND IMAGING | Facility: CLINIC | Age: 65
End: 2025-03-05
Payer: MEDICARE

## 2025-03-05 ENCOUNTER — APPOINTMENT (OUTPATIENT)
Dept: RADIOLOGY | Facility: CLINIC | Age: 65
End: 2025-03-05
Payer: MEDICARE

## 2025-03-05 ENCOUNTER — APPOINTMENT (OUTPATIENT)
Dept: MAMMOGRAPHY | Facility: CLINIC | Age: 65
End: 2025-03-05
Payer: MEDICARE

## 2025-03-05 PROCEDURE — 77080 DXA BONE DENSITY AXIAL: CPT

## 2025-03-05 PROCEDURE — 77067 SCR MAMMO BI INCL CAD: CPT

## 2025-03-05 PROCEDURE — 76641 ULTRASOUND BREAST COMPLETE: CPT | Mod: 50,GA

## 2025-03-05 PROCEDURE — 77063 BREAST TOMOSYNTHESIS BI: CPT

## 2025-03-11 ENCOUNTER — NON-APPOINTMENT (OUTPATIENT)
Age: 65
End: 2025-03-11

## 2025-03-11 DIAGNOSIS — E83.52 HYPERCALCEMIA: ICD-10-CM

## 2025-03-26 ENCOUNTER — NON-APPOINTMENT (OUTPATIENT)
Age: 65
End: 2025-03-26

## 2025-04-02 ENCOUNTER — NON-APPOINTMENT (OUTPATIENT)
Age: 65
End: 2025-04-02

## 2025-04-09 ENCOUNTER — APPOINTMENT (OUTPATIENT)
Dept: ULTRASOUND IMAGING | Facility: IMAGING CENTER | Age: 65
End: 2025-04-09

## 2025-04-09 ENCOUNTER — OUTPATIENT (OUTPATIENT)
Dept: OUTPATIENT SERVICES | Facility: HOSPITAL | Age: 65
LOS: 1 days | End: 2025-04-09
Payer: MEDICARE

## 2025-04-09 DIAGNOSIS — E83.52 HYPERCALCEMIA: ICD-10-CM

## 2025-04-09 PROCEDURE — 76536 US EXAM OF HEAD AND NECK: CPT

## 2025-04-09 PROCEDURE — 76536 US EXAM OF HEAD AND NECK: CPT | Mod: 26

## 2025-04-17 ENCOUNTER — NON-APPOINTMENT (OUTPATIENT)
Age: 65
End: 2025-04-17

## 2025-05-15 ENCOUNTER — APPOINTMENT (OUTPATIENT)
Dept: SURGERY | Facility: CLINIC | Age: 65
End: 2025-05-15
Payer: COMMERCIAL

## 2025-05-15 VITALS — HEIGHT: 70 IN | WEIGHT: 215 LBS | BODY MASS INDEX: 30.78 KG/M2

## 2025-05-15 DIAGNOSIS — E55.9 VITAMIN D DEFICIENCY, UNSPECIFIED: ICD-10-CM

## 2025-05-15 DIAGNOSIS — E83.52 HYPERCALCEMIA: ICD-10-CM

## 2025-05-15 DIAGNOSIS — E03.9 HYPOTHYROIDISM, UNSPECIFIED: ICD-10-CM

## 2025-05-15 PROCEDURE — 99214 OFFICE O/P EST MOD 30 MIN: CPT

## 2025-05-15 PROCEDURE — 36415 COLL VENOUS BLD VENIPUNCTURE: CPT

## 2025-05-16 ENCOUNTER — NON-APPOINTMENT (OUTPATIENT)
Age: 65
End: 2025-05-16

## 2025-05-16 LAB
25(OH)D3 SERPL-MCNC: 40.1 NG/ML
CALCIUM SERPL-MCNC: 10.6 MG/DL
CALCIUM SERPL-MCNC: 10.6 MG/DL
PARATHYROID HORMONE INTACT: 69 PG/ML
T3 SERPL-MCNC: 105 NG/DL
T4 FREE SERPL-MCNC: 1.4 NG/DL
THYROGLOB AB SERPL-ACNC: 19.3 IU/ML
THYROPEROXIDASE AB SERPL IA-ACNC: 14 IU/ML
TSH SERPL-ACNC: 2.77 UIU/ML

## 2025-05-27 ENCOUNTER — RX RENEWAL (OUTPATIENT)
Age: 65
End: 2025-05-27

## 2025-05-27 ENCOUNTER — APPOINTMENT (OUTPATIENT)
Dept: CT IMAGING | Facility: IMAGING CENTER | Age: 65
End: 2025-05-27

## 2025-05-27 ENCOUNTER — OUTPATIENT (OUTPATIENT)
Dept: OUTPATIENT SERVICES | Facility: HOSPITAL | Age: 65
LOS: 1 days | End: 2025-05-27
Payer: MEDICARE

## 2025-05-27 DIAGNOSIS — E55.9 VITAMIN D DEFICIENCY, UNSPECIFIED: ICD-10-CM

## 2025-05-27 PROCEDURE — 70492 CT SFT TSUE NCK W/O & W/DYE: CPT | Mod: 26

## 2025-05-27 PROCEDURE — 70492 CT SFT TSUE NCK W/O & W/DYE: CPT

## 2025-06-05 ENCOUNTER — NON-APPOINTMENT (OUTPATIENT)
Age: 65
End: 2025-06-05

## 2025-07-11 ENCOUNTER — RX RENEWAL (OUTPATIENT)
Age: 65
End: 2025-07-11

## 2025-07-30 ENCOUNTER — APPOINTMENT (OUTPATIENT)
Dept: UROGYNECOLOGY | Facility: CLINIC | Age: 65
End: 2025-07-30